# Patient Record
Sex: FEMALE | Race: ASIAN | URBAN - METROPOLITAN AREA
[De-identification: names, ages, dates, MRNs, and addresses within clinical notes are randomized per-mention and may not be internally consistent; named-entity substitution may affect disease eponyms.]

---

## 2017-03-24 ENCOUNTER — OFFICE VISIT - HEALTHEAST (OUTPATIENT)
Dept: FAMILY MEDICINE | Facility: CLINIC | Age: 37
End: 2017-03-24

## 2017-03-24 DIAGNOSIS — R42 VERTIGO: ICD-10-CM

## 2017-03-24 DIAGNOSIS — R11.2 NAUSEA & VOMITING: ICD-10-CM

## 2017-11-06 ENCOUNTER — COMMUNICATION - HEALTHEAST (OUTPATIENT)
Dept: FAMILY MEDICINE | Facility: CLINIC | Age: 37
End: 2017-11-06

## 2018-04-05 ENCOUNTER — RECORDS - HEALTHEAST (OUTPATIENT)
Dept: ADMINISTRATIVE | Facility: OTHER | Age: 38
End: 2018-04-05

## 2018-04-09 ENCOUNTER — RECORDS - HEALTHEAST (OUTPATIENT)
Dept: ADMINISTRATIVE | Facility: OTHER | Age: 38
End: 2018-04-09

## 2018-04-10 ENCOUNTER — COMMUNICATION - HEALTHEAST (OUTPATIENT)
Dept: ADMINISTRATIVE | Facility: CLINIC | Age: 38
End: 2018-04-10

## 2018-04-20 ENCOUNTER — OFFICE VISIT - HEALTHEAST (OUTPATIENT)
Dept: ENDOCRINOLOGY | Facility: CLINIC | Age: 38
End: 2018-04-20

## 2018-04-20 DIAGNOSIS — E05.90 THYROTOXICOSIS: ICD-10-CM

## 2018-04-20 RX ORDER — CHLORAL HYDRATE 500 MG
10001 CAPSULE ORAL DAILY
Status: SHIPPED | COMMUNITY
Start: 2018-04-20

## 2018-04-20 ASSESSMENT — MIFFLIN-ST. JEOR: SCORE: 1450.39

## 2018-04-27 DIAGNOSIS — O30.031 MONOCHORIONIC DIAMNIOTIC TWIN GESTATION IN FIRST TRIMESTER: Primary | ICD-10-CM

## 2018-05-01 ENCOUNTER — PRE VISIT (OUTPATIENT)
Dept: MATERNAL FETAL MEDICINE | Facility: CLINIC | Age: 38
End: 2018-05-01

## 2018-05-08 ENCOUNTER — OFFICE VISIT (OUTPATIENT)
Dept: MATERNAL FETAL MEDICINE | Facility: CLINIC | Age: 38
End: 2018-05-08
Attending: OBSTETRICS & GYNECOLOGY
Payer: COMMERCIAL

## 2018-05-08 ENCOUNTER — HOSPITAL ENCOUNTER (OUTPATIENT)
Dept: ULTRASOUND IMAGING | Facility: CLINIC | Age: 38
Discharge: HOME | End: 2018-05-08
Attending: OBSTETRICS & GYNECOLOGY | Admitting: OBSTETRICS & GYNECOLOGY
Payer: COMMERCIAL

## 2018-05-08 DIAGNOSIS — O30.031 MONOCHORIONIC DIAMNIOTIC TWIN GESTATION IN FIRST TRIMESTER: ICD-10-CM

## 2018-05-08 DIAGNOSIS — O09.521 ADVANCED MATERNAL AGE IN MULTIGRAVIDA, FIRST TRIMESTER: Primary | ICD-10-CM

## 2018-05-08 DIAGNOSIS — D56.3 ALPHA THALASSEMIA SILENT CARRIER: ICD-10-CM

## 2018-05-08 PROCEDURE — 96040 ZZH GENETIC COUNSELING, EACH 30 MINUTES: CPT | Mod: ZF | Performed by: GENETIC COUNSELOR, MS

## 2018-05-08 PROCEDURE — 76801 OB US < 14 WKS SINGLE FETUS: CPT

## 2018-05-08 NOTE — MR AVS SNAPSHOT
After Visit Summary   5/8/2018    Soni Lo    MRN: [id number]           Patient Information     Date Of Birth          1980        Visit Information        Provider Department      5/8/2018 10:15 AM Lexi Banuelos GC [facility] Maternal Fetal Medicine [facility]        Today's Diagnoses     Advanced maternal age in multigravida, first trimester    -  1    Monochorionic diamniotic twin gestation in first trimester        Alpha thalassemia silent carrier           Follow-ups after your visit        Your next 10 appointments already scheduled     Jun 08, 2018  8:45 AM CDT   MFM US COMPRE TWINS with [id number]   [facility] Maternal Fetal Medicine Ultrasound - [facility])    [address]  [facility] [address] [phone]           Wear comfortable clothes and leave your valuables at home.            Jun 08, 2018 10:00 AM CDT   Radiology MD with RH BLANCA PALUMBO   [facility] Maternal Fetal Medicine [facility])    [address]  [facility] [id number] [phone]           Please arrive at the time given for your first appointment. This visit is used internally to schedule the physician's time during your ultrasound.            Jul 06, 2018 11:45 AM CDT   MFM US COMPRE TWINS F/U with [id number]   [facility] Maternal Fetal Medicine Ultrasound - [place] ([facility])    [address]  [facility] [address] [phone]           Wear comfortable clothes and leave your valuables at home.            Jul 06, 2018 12:15 PM CDT   Radiology MD with UR BLANCA PALUMBO   [facility] Maternal Fetal Medicine - [facility])    [address]  [facility] [address]   [phone]           Please arrive at the time given for your first appointment. This visit is used internally to schedule the physician's time during your ultrasound.             "Jul 06, 2018  1:00 PM CDT   Ech Fetal Complete* with [email or web address]   [facility] Echo/EKG ([facility])    [address]  [facility] [place] [address]               Jul 06, 2018  2:00 PM CDT   Ech Fetal -Twin B Complete* with [email or web address]   [facility] Echo/EKG ([facility])    [address]  [facility] [place] [address]                 Future tests that were ordered for you today     Open Future Orders        Priority Expected Expires Ordered    MFM US Comprehensive Twins F/U Routine  3/8/2019 5/8/2018    Echo Fetal Complete-Peds Cardiology Routine  5/8/2019 5/8/2018    Echo Fetal - Twin B Complete - Peds Cardiology Routine  5/8/2019 5/8/2018            Who to contact     If you have questions or need follow up information about today's clinic visit or your schedule please contact [facility] directly at [phone].  Normal or non-critical lab and imaging results will be communicated to you by Genoomhart, letter or phone within 4 business days after the clinic has received the results. If you do not hear from us within 7 days, please contact the clinic through Nodeablet or phone. If you have a critical or abnormal lab result, we will notify you by phone as soon as possible.  Submit refill requests through Storspeed or call your pharmacy and they will forward the refill request to us. Please allow 3 business days for your refill to be completed.          Additional Information About Your Visit        Storspeed Information     Storspeed lets you send messages to your doctor, view your test results, renew your prescriptions, schedule appointments and more. To sign up, go to [email or web address] . Click on \"Log in\" on the left side of the screen, which will take you to the Welcome page. Then click on \"Sign up Now\" on the right side of the page.     You will be asked to enter the access code listed below, as well as some personal information. Please follow the " directions to create your username and password.     Your access code is: [email or web address]  Expires: 2018  3:46 PM     Your access code will  in 90 days. If you need help or a new code, please call your [facility] clinic or [phone].        Care EveryWhere ID     This is your Care EveryWhere ID. This could be used by other organizations to access your [facility] medical records  [id number]        Your Vitals Were     Last Period                   2018            Blood Pressure from Last 3 Encounters:   No data found for BP    Weight from Last 3 Encounters:   No data found for Wt              We Performed the Following     MFM Genetic Counseling        Primary Care Provider    None Specified       No primary provider on file.        Equal Access to Services     SONIA VICENTE : Joao Erickson, juany jorge, jen chaparro, evan barrow . [facility] [phone].    ATENCIÓN: Si habla español, tiene a luciano disposición servicios gratuitos de asistencia lingüística. Llame al [phone].    We comply with applicable federal civil rights laws and [place] laws. We do not discriminate on the basis of race, color, national origin, age, disability, sex, sexual orientation, or gender identity.            Thank you!     Thank you for choosing [facility]  for your care. Our goal is always to provide you with excellent care. Hearing back from our patients is one way we can continue to improve our services. Please take a few minutes to complete the written survey that you may receive in the mail after your visit with us. Thank you!             Your Updated Medication List - Protect others around you: Learn how to safely use, store and throw away your medicines at www.disposemymeds.org.      Notice  As of 2018  5:07 PM    You have not been prescribed any medications.

## 2018-05-08 NOTE — PROGRESS NOTES
"Please see \"Imaging\" tab under \"Chart Review\" for details of today's visit.    Marnie Rosenbaum    "

## 2018-05-08 NOTE — PROGRESS NOTES
[facility]etal [facility]  Genetic Counseling Consult    Patient: Soni Lo YOB: 1980   Date of Service: May 8, 2018 Referring Provider:  Dr. 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  She was accompanied to clinic today by her .       Summary/Plan:     1.  At age 38 at delivery, the risk for a woman to have a baby at birth with a chromosome problem is about 0.8%.  We talked about that most monochorionic, diamniotic twin pregnancies come from a single conception, and so this risk figure would be expected to apply to the approximate risk for both babies to have a chromosome problem.  However, Soni had a normal NIPT analysis earlier in pregnancy through her local prenatal care provider's office.  While the exact sensitivity of this test in twin gestations is still not definitively known, this normal result would be expected to reduce the risk of a chromosome problem in this pregnancy.     2.  Additionally, we reviewed Soni's previous carrier screening, which indicated that she is a single alpha-globin gene deletion carrier.  We reviewed the inheritance pattern associated with alpha-thalassemia.  We talked about that in order for their child to be at risk for a clinically significant alpha-thalassemia disorder, we would generally expect that her  must carry two, cis alpha-globin gene deletions (or other alpha-globin gene mutations).  We talked about that his risk of being a carrier of alpha-thalassemia is likely low, given his  ancestry.  We talked about both the options for general hemoglobinopathy carrier  screening (MCV, MCH, and a quantitative hemoglobin analysis) and for genetic screening of the alpha globin genes.  Soni and her  decline either form of carrier screening for Soni's  at this time.  We did talk about that babies are screening for alpha-thalassemia (and other hemoglobinopathies) after birth as part of the [place]  screen.     3.  After our discussion, Soni had a first trimester ultrasound and a maternal-fetal medicine consultation.  Please see Dr. Rosenbaum's report for more details of that consultation.     4. Maternal serum AFP only (single marker screen) is recommended to be considered after 15 weeks to screen for open neural tube defects. A quad screen is not recommended to be performed, since and NIPT analysis was previously performed.    5. An 18-20 week comprehensive ultrasound is generally considered standard of care for all women 35 or older at delivery and who decline invasive testing for chromosome problems during pregnancy.  This ultrasound has been scheduled for 18 in our clinic.    Pregnancy History:   /Parity:      Age at Delivery: 38 year old  FAUSTO: 2018, by Last Menstrual Period  Gestational Age: 13w6d    Soni reports that she takes prenatal vitamins, a fish oil supplement, and a calcium supplement.      Soni reports that she smokes around 2 cigarettes a day.  She reports that she is familiar with the concerns about smoking during pregnancy, such as an increased risk for low birth weight and  delivery.  We talked about that reducing the amount of smoking (preferably quitting) can reduce these risks.  It is reported that this is a decrease from her level of pre-pregnancy smoking of about 15 cigarettes a day.      Per her prenatal record, Soni has had a couple of first trimester ultrasounds that have shown that this is a monochorionic, diamniotic twin pregnancy.      Soni reports that her two previous pregnancies occurred with a  "previous partner; she reports no complications with those pregnancies.       Family History:     A four-generation pedigree was obtained and will be scanned under the  Media  tab in EPIC. The following significant findings were reported by Soni and her :      As noted above, Soni had carrier screening that indicated that she is likely a single alpha-globin gene deletion carrier for alpha-thalassemia.  See Carrier Screening section below for more details.      Soni reports that she has a maternal first cousin that  of a \"heart attack\" in his 20s.  She didn't know any details about the type of heart issue this was.  We talked about that there are a variety of different types of heart conditions associated with sudden cardiac death that can be caused by genetic factors.  Soni was not aware of any specific genetic diagnosis in her cousin, nor was she aware of any other history of early onset sudden cardiac death in numerous other relatives on that side of the family.  It is hard to be specific about risks related to this history without more specific information.      Soni's  reports that he has a paternal aunt that  around age 10 of complications of a congenital heart defect.  He couldn't remember the exact name of this issue, but he thought that she had a heart valve replaced.  Soni's  was not aware of any other physical birth defects, significant medical issues, or developmental concerns in that individual; she was not aware of any other family history of heart defects. We talked about that most heart defects occur as an isolated birth defect, meaning that most commonly, they are not part of an underlying genetic syndrome or condition.  We talked about that most heart defects are thought to have a \"multifactorial inheritance\" pattern, meaning that there are likely several genetic and/or environmental factors that have to come together in order to produce that birth defect.  We " talked about that for most types of birth defects, we don't know those exact risk factors.    Soni's  reports that he also had a paternal aunt that  of breast cancer in her late 30s.  We talked about that some families with a history of early onset breast cancer have a hereditary risk factor in the family that is increasing the risk of some cancers in the family.  We talked about that there are family cancer clinics available for individuals who want more specific genetic counseling about their family history of cancer and about additional screening and testing recommendations that might be made based on this history.  He reports that he is not aware of anybody in the family having genetic counseling or genetic testing with respect to this issue.      Soni's  reports that he has a second (or farther) cousin that maybe has a diagnosis of Down syndrome.  We talked about that the vast majority of Down syndrome results from a sporadic mistake in the formation of sperm or egg that went on to lead to that child. Soni's  was not aware of any familial chromosome rearrangement that would increase the risk of a chromosome problem in this pregnancy.    Otherwise, the reported family history is negative for multiple miscarriages, stillbirths, and consanguinity.       Carrier Screening/Review of Previous Carrier Screening Results:        Soni's  reports that he is of  ancestry.  We briefly talked about that cystic fibrosis is an autosomal recessive genetic condition that occurs with increased frequency in individuals of  ancestry and carrier screening for this condition is available.  In addition,  screening in the [facility] includes cystic fibrosis.       Soni reports that she is of [place] ancestry.  We talked about that the hemoglobinopathies are a group of genetic blood diseases that occur with increased frequency in individuals of  ancestry and  carrier screening for these conditions is available.  Carrier screening for the hemoglobinopathies includes a CBC with red blood cell indices, a ferritin level, and a quantitative hemoglobin electrophoresis or HPLC.  In addition,  screening in the [facility] includes many of the hemoglobinopathies.    In addition to ethnic-based carrier screening, we talked about that expanded carrier screening for mutations in a large panel of genes associated with autosomal recessive conditions (including cystic fibrosis, spinal muscular atrophy, and others) and X-linked recessive conditions (like Fragile X syndrome) is now available      As noted above, Soni had some carrier screening drawn through her primary prenatal carrier provider.  Her carrier screening through [facility] for cystic fibrosis, Spinal Muscular Atrophy (SMA), and Fragile X syndrome were all negative.  We talked about that those negative carrier screening results likely significantly reduces the risk that she would have a child with one of these condition.      Soni's had a normal MCV, normal MCH, and a normal quantitative hemoglobin analysis as part of this screening.  However, this screening also included genetic testing for the alpha-globin genes, which was reported to show a single alpha-globin gene deletion in Soni. Because of this, we spent some time reviewing the following information:    - We talked about that alpha-thalassemia is a genetic condition that involves decreased production of part of the hemoglobin protein, which is a protein that carries oxygen in the blood.  Hemoglobin usually consists of 2 alpha- globin subunits and 2 beta-globin subunits.  Alpha-thalassemia is a condition in which less than the normal amount of alpha-globin subunits are made; this is usually because of deletions of some or all of the alpha-globin genes, but more rarely, this can be related to point mutation in the alpha-globin genes.    - We talked about  "that, in general, most people inherit 4 genes that give his/her body information about how to make the alpha-globin subunit of the hemoglobin protein.  Individuals who are missing some these genes have varying degrees of symptoms depending on how many genes are missin) Individuals that are missing 1 alpha-globin gene (\"1 gene deletion\") often do not have obvious symptoms, and because of this, are called \"silent carriers\".  We talked about that Soni's blood testing is consistent with this type of result.       2) Individuals who are missing 2 alpha-globin genes (\"2 gene deletions\") are often found to have microcytic anemia on blood testing that is not related to iron deficiency anemia; this is called alpha-thalassemia trait. Most individuals with alpha-thalassemia trait do not have any significant medical issues for which they require treatment.  It is important information for their physicians to know this information, primarily so they aren't given large doses of iron, which would not \"fix\" their microcytosis.        3) Individuals who are missing 3 alpha-globin genes (\"3 gene deletions\") have hemoglobin H disease, which can be associated with clinically significant chronic anemia that might require additional medical treatment.        4) Babies that have no alpha-globin genes (\"4 gene deletions\") have a severe condition called alpha- thalassemia major. This condition generally leads to severe fetal hydrops during pregnancy and usually results in a stillbirth.      We talked about that in order for Soni and her  to have a baby with a clinically significant hemoglobinopathy, it would be expected that Soni's  would have to also be a carrier of an alpha- globin-related hemoglobinopathy.  We talked about that since Soni's test results indicated that she only carries one alpha-globin gene deletion, her  would likely have to carry two cis alpha-globin gene deletions (or otherwise " abnormal alpha-globin gene), since children need to generally have only one working alpha-globin gene (or none) in order to have a clinically significant form of alpha-thalassemia.  We talked about that given that Soni's  is of  ancestry, the chance that he only has two working alpha-globin genes in unlikely.  Soni's  reports that he does not recall ever being told that he has microcytic anemia on previous blood testing.       We talked about that there is some blood testing that can be done that provides more information about whether or not an individual carries any unusual hemoglobin and/or has gene deletions of the alpha-globin subunit genes.  We talked about that routine carrier screening for hemoglobinopathies includes a CBC with red blood indices and a quantitative hemoglobin electrophoresis or HPLC, a ferritin level.  Additionally, we talked about that genetic testing looking at the alpha-globin genes for more detailed screening is also available.  However, given the likely still low chance for a clinically significant hemoglobinopathy in this pregnancy (and that alpha-thalassemia is screened for by the [place]  screen), Soni and her  indicated that they are not interested in carrier screening for Soni's  at this time.       Risk Assessment for Chromosome Conditions:       We talked about that the risk for fetal chromosome abnormalities increases with maternal age. We briefly discussed specific features of common chromosome abnormalities, including Down syndrome, trisomy 13, trisomy 18, and sex chromosome trisomies.       At age 38 at delivery, the risk to have a baby with any chromosome abnormality at birth is about1 in 129.  (At age 37 at midtrimester, the risk to have a baby with any chromosome abnormality at midtrimester is about 1 in 105.)  We talked about that most monochorionic, diamniotic twin pregnancies come from a single conception, and so this risk  figure would be expected to apply to the approximate risk for both babies to have a chromosome problem.          Soni had maternal serum-based screening earlier in pregnancy. See below for summary of her test results:     Non-invasive Prenatal Testing (NIPT)  - This test involves measurement of cell-free DNA testing, which is of both maternal and placental/fetal origin.  - This test screens for fetal trisomy 21, trisomy 13, and trisomy 18 in twin gestations.  - While this test is thought to be highly specific/sensitive with respect to screening for trisomy 21, trisomy 13, and trisomy 18 in apple pregnancies, rare false positives and false negatives do occur. We talked about that the test performance (eg. sensitivity/specificity) of non-invasive prenatal testing in multiple gestations is not well known at this time; we talked about that limited data has been published regarding NIPT in twin or other multiple gestations.     Soni had a Innatal test earlier in pregnancy; we reviewed the results today, which are normal for chromosome 13, chromosome 18 and chromosome 21 (no aneuploidy detected).     While the exact sensitivity of this test in twin gestations is still not definitively known, this normal result would be expected to reduce the risk of trisomy 13, trisomy 18, and trisomy 21 in this pregnancy.     - This test cannot screen for open neural tube defects, and so consideration of maternal serum AFP 15 weeks or later is recommended.    Testing Options:       We reviewed the benefits and limitations of screening and diagnostic tests:    - We talked about that screening tests provide a risk assessment specific to the pregnancy for certain fetal chromosome abnormalities, but cannot definitively diagnose or exclude a fetal chromosome abnormality. Follow-up genetic counseling and consideration of diagnostic testing is recommended with any abnormal screening result.     - We discussed that dagnostic tests are  associated with a risk of miscarriage. These tests can detect fetal chromosome abnormalities with greater than 99% certainty. Results can rarely be complicated by maternal cell contamination or mosaicism and are limited by the resolution of cytogenetic G-banding technology.     -There is no screening nor diagnostic test that can detect all forms of birth defects or mental disability.      We discussed the following screening and testing options:     Genetic Amniocentesis  - This is an invasive procedure typically performed at 15 weeks or later, through which amniotic fluid is obtained for the purpose of chromosome analysis and/or other prenatal genetic analysis.  - Amniocentesis is considered a diagnostic test for chromosome problems during pregnancy.  - The risk of pregnancy loss associated with amniocentesis is generally estimated to be 1/500 or less.  - Amniotic fluid AFP measurement is also done to screen for the possibility of open neural tube or ventral defects.     Comprehensive (Level II) ultrasound  - This detailed ultrasound is usually performed between 18-22 weeks gestation to screen for major birth defects.  - It also screens for ultrasound markers that might increase the risk for a chromosome problem in a pregnancy.     Face-to-face time of the genetic counseling appointment was 45 minutes.    Nancy Banuelos MS, [facility]  Genetic Counselor  Ph: [phone]  Pager: [phone]

## 2018-05-08 NOTE — MR AVS SNAPSHOT
After Visit Summary   5/8/2018    Soni Lo    MRN: [id number]           Patient Information     Date Of Birth          1980        Visit Information        Provider Department      5/8/2018 11:45 AM Marnie Rosenbaum MD [facility] Maternal Fetal Medicine [facility]        Today's Diagnoses     Monochorionic diamniotic twin gestation in first trimester           Follow-ups after your visit        Your next 10 appointments already scheduled     Jun 08, 2018  8:45 AM CDT   MFM US COMPRE TWINS with RHMFMUSR3   [facility] Maternal Fetal Medicine Ultrasound - [facility] ([facility])    [address]  [facility] [address]   [phone]           Wear comfortable clothes and leave your valuables at home.            Jun 08, 2018 10:00 AM CDT   Radiology MD with  BLANCA PALUMBO   [facility] Maternal Fetal Medicine [facility] ([facility])    [address]  [facility] [address] [phone]           Please arrive at the time given for your first appointment. This visit is used internally to schedule the physician's time during your ultrasound.            Jul 06, 2018 11:45 AM CDT   MFM US COMPRE TWINS F/U with URMFMUSR3   [facility] Maternal Fetal Medicine Ultrasound - [place] ([facility])    [address]  [facility] [address] [phone]           Wear comfortable clothes and leave your valuables at home.            Jul 06, 2018 12:15 PM CDT   Radiology MD with UR BLANCA PALUMBO   [facility] Maternal Fetal Medicine - [facility])    [address]  [facility] [address]   [phone]           Please arrive at the time given for your first appointment. This visit is used internally to schedule the physician's time during your ultrasound.            Jul 06, 2018  1:00 PM CDT   Ech Fetal Complete* with URFETR1   [facility] Echo[facility]EKG ([facility]  "[facility])    [address]  [facility] [place] [address]               Jul 06, 2018  2:00 PM CDT   Ech Fetal -Twin B Complete* with URFETR1   [facility] ([facility])    [address]  [facility] [place] [address]                 Future tests that were ordered for you today     Open Future Orders        Priority Expected Expires Ordered    MFM US Comprehensive Twins F/U Routine  3/8/2019 5/8/2018    Echo Fetal Complete-Peds Cardiology Routine  5/8/2019 5/8/2018    Echo Fetal - Twin B Complete - Peds Cardiology Routine  5/8/2019 5/8/2018            Who to contact     If you have questions or need follow up information about today's clinic visit or your schedule please contact [employer] MATERNAL FETAL MEDICINE [facility] directly at [phone].  Normal or non-critical lab and imaging results will be communicated to you by MyChart, letter or phone within 4 business days after the clinic has received the results. If you do not hear from us within 7 days, please contact the clinic through [employer] or phone. If you have a critical or abnormal lab result, we will notify you by phone as soon as possible.  Submit refill requests through M:Metrics or call your pharmacy and they will forward the refill request to us. Please allow 3 business days for your refill to be completed.          Additional Information About Your Visit        [employer] Information     [employer] lets you send messages to your doctor, view your test results, renew your prescriptions, schedule appointments and more. To sign up, go to [email or web address] . Click on \"Log in\" on the left side of the screen, which will take you to the Welcome page. Then click on \"Sign up Now\" on the right side of the page.     You will be asked to enter the access code listed below, as well as some personal information. Please follow the directions to create your username and password.     Your access code is: [email or web address]  Expires: 8/6/2018  " 3:46 PM     Your access code will  in 90 days. If you need help or a new code, please call your [facility] clinic or [phone].        Care EveryWhere ID     This is your Care EveryWhere ID. This could be used by other organizations to access your [facility] medical records  [id number]        Your Vitals Were     Last Period                   2018            Blood Pressure from Last 3 Encounters:   No data found for BP    Weight from Last 3 Encounters:   No data found for Wt              We Performed the Following     MFM Office Visit        Primary Care Provider    None Specified       No primary provider on file.        Equal Access to Services     [facility]: Hadii aad ku hadasho Soomaali, waaxda luqadaha, qaybta kaalmada krysta, evan barrow . So [facility] [phone].    ATENCIÓN: Si habla español, tiene a luicano disposición servicios gratuitos de asistencia lingüística. Llame al [phone].    We comply with applicable federal civil rights laws and [place] laws. We do not discriminate on the basis of race, color, national origin, age, disability, sex, sexual orientation, or gender identity.            Thank you!     Thank you for choosing [facility]  for your care. Our goal is always to provide you with excellent care. Hearing back from our patients is one way we can continue to improve our services. Please take a few minutes to complete the written survey that you may receive in the mail after your visit with us. Thank you!             Your Updated Medication List - Protect others around you: Learn how to safely use, store and throw away your medicines at www.disposemymeds.org.      Notice  As of 2018  3:46 PM    You have not been prescribed any medications.

## 2018-05-24 ENCOUNTER — HOSPITAL ENCOUNTER (OUTPATIENT)
Dept: ULTRASOUND IMAGING | Facility: CLINIC | Age: 38
Discharge: HOME | End: 2018-05-24
Attending: OBSTETRICS & GYNECOLOGY | Admitting: OBSTETRICS & GYNECOLOGY
Payer: COMMERCIAL

## 2018-05-24 ENCOUNTER — OFFICE VISIT (OUTPATIENT)
Dept: MATERNAL FETAL MEDICINE | Facility: CLINIC | Age: 38
End: 2018-05-24
Attending: OBSTETRICS & GYNECOLOGY
Payer: COMMERCIAL

## 2018-05-24 DIAGNOSIS — O30.032 MONOCHORIONIC DIAMNIOTIC TWIN GESTATION IN SECOND TRIMESTER: Primary | ICD-10-CM

## 2018-05-24 DIAGNOSIS — O26.90 PREGNANCY RELATED CONDITION, ANTEPARTUM: ICD-10-CM

## 2018-05-24 PROCEDURE — 76810 OB US >/= 14 WKS ADDL FETUS: CPT

## 2018-05-24 PROCEDURE — 76820 UMBILICAL ARTERY ECHO: CPT | Performed by: OBSTETRICS & GYNECOLOGY

## 2018-05-24 NOTE — MR AVS SNAPSHOT
After Visit Summary   5/24/2018    Soni Lo    MRN: [id number]           Patient Information     Date Of Birth          1980        Visit Information        Provider Department      5/24/2018 2:45 PM Kristan Andrade,  [facility] Maternal Fetal Medicine [facility]        Today's Diagnoses     Monochorionic diamniotic twin gestation in second trimester    -  1       Follow-ups after your visit        Your next 10 appointments already scheduled     Jun 01, 2018  2:15 PM CDT   MFM US COMPRE TWINS F/U with URMFMUSR4   [facility]ound - [place] ([facility])    [address]  [facility] [address] [phone]           Wear comfortable clothes and leave your valuables at home.            Jun 01, 2018  2:45 PM CDT   Radiology MD with UR BLANCA PALUMBO   [facility] - [facility])    [address]  [facility] [address]   [phone]           Please arrive at the time given for your first appointment. This visit is used internally to schedule the physician's time during your ultrasound.            Jun 08, 2018  8:45 AM CDT   MFM US COMPRE TWINS with RHMFMUSR3   [facility]ound - [facility])    [address]  [facility] [id number] [phone]           Wear comfortable clothes and leave your valuables at home.            Jun 08, 2018 10:00 AM CDT   Radiology MD with RH BLANCA PALUMBO   [facility] - [facility])    [address]  [facility] [id number] [phone]           Please arrive at the time given for your first appointment. This visit is used internally to schedule the physician's time during your ultrasound.            Jul 06, 2018 11:45 AM CDT   MFM US COMPRE TWINS F/U with URMFMUSR3   [facility] Ul[facility]ound - [place] ([facility])    [address]  [facility] [address] [phone]           Wear comfortable clothes and leave your valuables at home.            Jul 06, 2018 12:15 PM CDT   Radiology MD with UR MFM MD   [facility] Maternal Fetal Medicine - [place] ([facility])    [address]  [facility] [address]   [phone]           Please arrive at the time given for your first appointment. This visit is used internally to schedule the physician's time during your ultrasound.            Jul 06, 2018  1:00 PM CDT   Ech Fetal Complete* with URFETR1   [facility] ([facility])    [address] [place] [facility] [address]               Jul 06, 2018  2:00 PM CDT   Ech Fetal -Twin B Complete* with URFETR1   [facility] Echo[facility]EKG ([facility])    [address] [facility] [address]                 Future tests that were ordered for you today     Open Future Orders        Priority Expected Expires Ordered    [facility] US Comprehensive Twins F/U Routine 5/31/2018 3/24/2019 5/24/2018    [facility] US OB Complete 2/3 Tri Twins Routine  3/24/2019 5/24/2018            Who to contact     If you have questions or need follow up information about today's clinic visit or your schedule please contact [facility] directly at [phone].  Normal or non-critical lab and imaging results will be communicated to you by Banch[employer]art, letter or phone within 4 business days after the clinic has received the results. If you do not hear from us within 7 days, please contact the clinic through [employer] or phone. If you have a critical or abnormal lab result, we will notify you by phone as soon as possible.  Submit refill requests through [employer] or call your pharmacy and they will forward the refill request to us. Please allow 3 business days for your refill  "to be completed.          Additional Information About Your Visit        [employer] Information     [employer] lets you send messages to your doctor, view your test results, renew your prescriptions, schedule appointments and more. To sign up, go to [email or web address] . Click on \"Log in\" on the left side of the screen, which will take you to the Welcome page. Then click on \"Sign up Now\" on the right side of the page.     You will be asked to enter the access code listed below, as well as some personal information. Please follow the directions to create your username and password.     Your access code is: [email or web address]  Expires: 2018  3:46 PM     Your access code will  in 90 days. If you need help or a new code, please call your [facility] clinic or [phone].        Care EveryWhere ID     This is your Care EveryWhere ID. This could be used by other organizations to access your [facility] medical records  [id number]        Your Vitals Were     Last Period                   2018            Blood Pressure from Last 3 Encounters:   No data found for BP    Weight from Last 3 Encounters:   No data found for Wt               Primary Care Provider    None Specified       No primary provider on file.        Equal Access to Services     ALLYSON VICENTE : Hadii sonia Erickson, wayonida ania, qaybta kaalmada krysta, evan frank. So [facility] [phone].    ATENCIÓN: Si habla español, tiene a luciano disposición servicios gratuitos de asistencia lingüística. Llame al [phone].    We comply with applicable federal civil rights laws and [place] laws. We do not discriminate on the basis of race, color, national origin, age, disability, sex, sexual orientation, or gender identity.            Thank you!     Thank you for choosing [facility]  for your care. Our goal is always to provide you with excellent care. Hearing back from our patients is one " way we can continue to improve our services. Please take a few minutes to complete the written survey that you may receive in the mail after your visit with us. Thank you!             Your Updated Medication List - Protect others around you: Learn how to safely use, store and throw away your medicines at www.disposemymeds.org.      Notice  As of 5/24/2018 11:59 PM    You have not been prescribed any medications.

## 2018-05-25 NOTE — PROGRESS NOTES
"Please see \"Imaging\" tab under \"Chart Review\" for details of today's US.    Kristan Andrade, DO    "

## 2018-06-01 ENCOUNTER — HOSPITAL ENCOUNTER (OUTPATIENT)
Dept: ULTRASOUND IMAGING | Facility: CLINIC | Age: 38
Discharge: HOME | End: 2018-06-01
Attending: OBSTETRICS & GYNECOLOGY | Admitting: OBSTETRICS & GYNECOLOGY
Payer: COMMERCIAL

## 2018-06-01 ENCOUNTER — OFFICE VISIT (OUTPATIENT)
Dept: MATERNAL FETAL MEDICINE | Facility: CLINIC | Age: 38
End: 2018-06-01
Attending: OBSTETRICS & GYNECOLOGY
Payer: COMMERCIAL

## 2018-06-01 DIAGNOSIS — O30.032 MONOCHORIONIC DIAMNIOTIC TWIN GESTATION IN SECOND TRIMESTER: Primary | ICD-10-CM

## 2018-06-01 DIAGNOSIS — O30.032 MONOCHORIONIC DIAMNIOTIC TWIN GESTATION IN SECOND TRIMESTER: ICD-10-CM

## 2018-06-01 PROCEDURE — 76820 UMBILICAL ARTERY ECHO: CPT | Performed by: OBSTETRICS & GYNECOLOGY

## 2018-06-01 PROCEDURE — 76816 OB US FOLLOW-UP PER FETUS: CPT

## 2018-06-01 NOTE — MR AVS SNAPSHOT
After Visit Summary   6/1/2018    Soni Lo    MRN: [id number]           Patient Information     Date Of Birth          1980        Visit Information        Provider Department      6/1/2018 2:45 PM Marnie Rosenbaum MD [facility] Maternal Fetal Medicine [facility]        Today's Diagnoses     Monochorionic diamniotic twin gestation in second trimester    -  1       Follow-ups after your visit        Your next 10 appointments already scheduled     Jun 08, 2018  8:45 AM CDT   MFM US COMPRE TWINS with RHMFMUSR3   [facility] Maternal Fetal Medicine Ultrasound - [facility] ([facility])    [address]  [facility] [id number] [phone]           Wear comfortable clothes and leave your valuables at home.            Jun 08, 2018 10:00 AM CDT   Radiology MD with  BLANCA PALUMBO   [facility] Maternal Fetal Medicine - [facility])    [address]  [facility] [id number] [phone]           Please arrive at the time given for your first appointment. This visit is used internally to schedule the physician's time during your ultrasound.            Jul 06, 2018 11:45 AM CDT   MFM US COMPRE TWINS F/U with URMFMUSR3   [facility] Maternal Fetal Medicine Ultrasound - [place] ([facility])    [address]  [facility] [address] [phone]           Wear comfortable clothes and leave your valuables at home.            Jul 06, 2018 12:15 PM CDT   Radiology MD with UR BLANCA PALUMBO   [facility] Maternal Fetal Medicine - [place] ([facility])    [address]  [facility] [address]   [phone]           Please arrive at the time given for your first appointment. This visit is used internally to schedule the physician's time during your ultrasound.            Jul 06, 2018  1:00 PM CDT   Ech Fetal Complete* with URFETR1   [facility] Echo[facility]EKG ([facility]  "[facility])    [address]  [facility] [address]               2018  2:00 PM CDT   Ech Fetal -Twin B Complete* with URFETR1   [facility] Echo/EKG ([facility])    [address]  [facility] [place] [address]                 Who to contact     If you have questions or need follow up information about today's clinic visit or your schedule please contact [facility] MATERNAL FETAL MEDICINE [facility] directly at [phone].  Normal or non-critical lab and imaging results will be communicated to you by TeamVisibilityhart, letter or phone within 4 business days after the clinic has received the results. If you do not hear from us within 7 days, please contact the clinic through [employer] or phone. If you have a critical or abnormal lab result, we will notify you by phone as soon as possible.  Submit refill requests through [employer] or call your pharmacy and they will forward the refill request to us. Please allow 3 business days for your refill to be completed.          Additional Information About Your Visit        [employer] Information     [employer] lets you send messages to your doctor, view your test results, renew your prescriptions, schedule appointments and more. To sign up, go to [email or web address] . Click on \"Log in\" on the left side of the screen, which will take you to the Welcome page. Then click on \"Sign up Now\" on the right side of the page.     You will be asked to enter the access code listed below, as well as some personal information. Please follow the directions to create your username and password.     Your access code is: [email or web address]  Expires: 2018  3:46 PM     Your access code will  in 90 days. If you need help or a new code, please call your [place] clinic or [phone].        Care EveryWhere ID     This is your Care EveryWhere ID. This could be used by other organizations to access your [place] medical records  [id number]        Your Vitals Were     " Last Period                   01/31/2018            Blood Pressure from Last 3 Encounters:   No data found for BP    Weight from Last 3 Encounters:   No data found for Wt              Today, you had the following     No orders found for display       Primary Care Provider    None Specified       No primary provider on file.        Equal Access to Services     SONIA VICENTE : Hadjasmin aad ku haddinorah Erickson, wayonida luqadaha, jen kaalmada krysta, evan hiroin hayaamathew annechristian hernandez pushpa frank. So [facility] [phone].    ATENCIÓN: Si habla español, tiene a luciano disposición servicios gratuitos de asistencia lingüística. Llame al [phone].    We comply with applicable federal civil rights laws and [place] laws. We do not discriminate on the basis of race, color, national origin, age, disability, sex, sexual orientation, or gender identity.            Thank you!     Thank you for choosing [facility]  for your care. Our goal is always to provide you with excellent care. Hearing back from our patients is one way we can continue to improve our services. Please take a few minutes to complete the written survey that you may receive in the mail after your visit with us. Thank you!             Your Updated Medication List - Protect others around you: Learn how to safely use, store and throw away your medicines at www.disposemymeds.org.      Notice  As of 6/1/2018  8:15 PM    You have not been prescribed any medications.

## 2018-06-08 ENCOUNTER — HOSPITAL ENCOUNTER (OUTPATIENT)
Dept: ULTRASOUND IMAGING | Facility: CLINIC | Age: 38
Discharge: HOME | End: 2018-06-08
Attending: OBSTETRICS & GYNECOLOGY | Admitting: OBSTETRICS & GYNECOLOGY
Payer: COMMERCIAL

## 2018-06-08 ENCOUNTER — OFFICE VISIT (OUTPATIENT)
Dept: MATERNAL FETAL MEDICINE | Facility: CLINIC | Age: 38
End: 2018-06-08
Attending: OBSTETRICS & GYNECOLOGY
Payer: COMMERCIAL

## 2018-06-08 DIAGNOSIS — O30.032 MONOCHORIONIC DIAMNIOTIC TWIN GESTATION IN SECOND TRIMESTER: Primary | ICD-10-CM

## 2018-06-08 DIAGNOSIS — O26.90 PREGNANCY RELATED CONDITION, ANTEPARTUM: ICD-10-CM

## 2018-06-08 PROCEDURE — 76811 OB US DETAILED SNGL FETUS: CPT

## 2018-06-08 PROCEDURE — 76820 UMBILICAL ARTERY ECHO: CPT | Mod: 59 | Performed by: OBSTETRICS & GYNECOLOGY

## 2018-06-08 NOTE — MR AVS SNAPSHOT
After Visit Summary   6/8/2018    Soni Lo    MRN: [id number]           Patient Information     Date Of Birth          1980        Visit Information        Provider Department      6/8/2018 10:00 AM Angie Hoang MD [facility] Maternal Fetal Medicine [facility]        Today's Diagnoses     Monochorionic diamniotic twin gestation in second trimester    -  1       Follow-ups after your visit        Your next 10 appointments already scheduled     Flynn 15, 2018 11:45 AM CDT   MFM US COMPRE TWINS F/U with RHMFMUSR1   [facility] Maternal Fetal Medicine Ultrasound - [facility] ([facility])    [address]  [facility] [address]   [phone]           Wear comfortable clothes and leave your valuables at home.            Flynn 15, 2018 12:15 PM CDT   Radiology MD with  BLANCA PALUMBO   [facility] Maternal Fetal Medicine [facility] ([facility])    [address]  [facility] [address]   [phone]           Please arrive at the time given for your first appointment. This visit is used internally to schedule the physician's time during your ultrasound.            Jun 22, 2018  9:30 AM CDT   MFM US COMPRE TWINS F/U with RHMFMUSR2   [facility] Maternal Fetal Medicine Ultrasound - [facility] ([facility])    [address]  [facility] [address]   [phone]           Wear comfortable clothes and leave your valuables at home.            Jun 22, 2018 10:00 AM CDT   Radiology MD with [facility]   [facility] Maternal Fetal Medicine [facility] ([facility])    [address]  [facility] [address]   [phone]           Please arrive at the time given for your first appointment. This visit is used internally to schedule the physician's time during your ultrasound.            Jun 29, 2018  8:00 AM CDT   MFM US COMPRE TWINS F/U with RHMFMUSR1   [facility] Maternal Fetal Medicine Ultrasound [facility]  ([facility])    [address]  [facility] [address]   [phone]           Wear comfortable clothes and leave your valuables at home.            Jun 29, 2018  8:30 AM CDT   Radiology MD with  BLANCA PALUMBO   [facility] Maternal Fetal Medicine - [facility] ([facility])    [address]  [facility] [address]   [phone]           Please arrive at the time given for your first appointment. This visit is used internally to schedule the physician's time during your ultrasound.            Jul 06, 2018 11:45 AM CDT   [facility] US COMPRE TWINS F/U with URMFMUSR3   [facility] Maternal Fetal Medicine Ultrasound - [place] ([facility])    [address]  [facility] [address] [phone]           Wear comfortable clothes and leave your valuables at home.            Jul 06, 2018 12:15 PM CDT   Radiology MD with UR BLANCA PALUMBO   [facility] Maternal Fetal Medicine - [facility])    [address]  [facility] [address]   [phone]           Please arrive at the time given for your first appointment. This visit is used internally to schedule the physician's time during your ultrasound.            Jul 06, 2018  1:00 PM CDT   Ech Fetal Complete* with URFETR1   [facility] Echo/EKG ([facility])    [address]  [facility] [place] [address]               Jul 06, 2018  2:00 PM CDT   Ech Fetal -Twin B Complete* with URFETR1   [facility] Echo/EKG ([facility])    [address]  [facility] [place] [address]                 Future tests that were ordered for you today     Open Future Orders        Priority Expected Expires Ordered    [facility] US Comprehensive Twins F/U Routine  4/8/2019 6/8/2018    [facility] US Comprehensive Twins F/U Routine 6/15/2018 4/8/2019 6/8/2018    [facility] US Comprehensive Twins F/U Routine 6/22/2018 4/8/2019 6/8/2018            Who to  "contact     If you have questions or need follow up information about today's clinic visit or your schedule please contact [facility] MATERNAL FETAL MEDICINE [facility] directly at [phone].  Normal or non-critical lab and imaging results will be communicated to you by MyChart, letter or phone within 4 business days after the clinic has received the results. If you do not hear from us within 7 days, please contact the clinic through MyChart or phone. If you have a critical or abnormal lab result, we will notify you by phone as soon as possible.  Submit refill requests through PeerIndex or call your pharmacy and they will forward the refill request to us. Please allow 3 business days for your refill to be completed.          Additional Information About Your Visit        PeerIndex Information     PeerIndex lets you send messages to your doctor, view your test results, renew your prescriptions, schedule appointments and more. To sign up, go to [email or web address] . Click on \"Log in\" on the left side of the screen, which will take you to the Welcome page. Then click on \"Sign up Now\" on the right side of the page.     You will be asked to enter the access code listed below, as well as some personal information. Please follow the directions to create your username and password.     Your access code is: [email or web address]  Expires: 2018  3:46 PM     Your access code will  in 90 days. If you need help or a new code, please call your [facility] clinic or [phone].        Care EveryWhere ID     This is your Care EveryWhere ID. This could be used by other organizations to access your [facility] medical records  [id number]        Your Vitals Were     Last Period                   2018            Blood Pressure from Last 3 Encounters:   No data found for BP    Weight from Last 3 Encounters:   No data found for Wt               Primary Care Provider    None Specified       No primary provider on file.        Equal " Access to Services     [facility] : Hadii sonia Erickson, wayonisindy jorge, goodevan olivear. So [facility] [phone].    ATENCIÓN: Si habla español, tiene a luciano disposición servicios gratuitos de asistencia lingüística. Llame al [phone].    We comply with applicable federal civil rights laws and [place] laws. We do not discriminate on the basis of race, color, national origin, age, disability, sex, sexual orientation, or gender identity.            Thank you!     Thank you for choosing [facility]  for your care. Our goal is always to provide you with excellent care. Hearing back from our patients is one way we can continue to improve our services. Please take a few minutes to complete the written survey that you may receive in the mail after your visit with us. Thank you!             Your Updated Medication List - Protect others around you: Learn how to safely use, store and throw away your medicines at www.disposemymeds.org.      Notice  As of 6/8/2018 11:00 AM    You have not been prescribed any medications.

## 2018-06-08 NOTE — PROGRESS NOTES
Please see full imaging report from ViewPoint program under imaging tab.    Findings reviewed with Soni and her partner today. The findings are again similar to what was seen two weeks ago, and I remain concerned that this might progress to TTTS or sIUGR. The placenta is posterior which makes her a candidate for possible laser treatment of placental vessels. We discussed referral to a practice that performs laser therapy if TTTS is suspected/diagnosed. She is planning delivery at [facility] with her primary OB, so they would like referral to [place]  [facility] practice for laser if indicated. Follow up weekly for TTTS check and growth every two weeks given concerns for TTTS or sIUGR development.     Angie Hoang MD  Maternal Fetal Medicine

## 2018-06-15 ENCOUNTER — OFFICE VISIT (OUTPATIENT)
Dept: MATERNAL FETAL MEDICINE | Facility: CLINIC | Age: 38
End: 2018-06-15
Attending: OBSTETRICS & GYNECOLOGY
Payer: COMMERCIAL

## 2018-06-15 ENCOUNTER — HOSPITAL ENCOUNTER (OUTPATIENT)
Dept: ULTRASOUND IMAGING | Facility: CLINIC | Age: 38
Discharge: HOME | End: 2018-06-15
Attending: OBSTETRICS & GYNECOLOGY | Admitting: OBSTETRICS & GYNECOLOGY
Payer: COMMERCIAL

## 2018-06-15 DIAGNOSIS — O30.032 MONOCHORIONIC DIAMNIOTIC TWIN GESTATION IN SECOND TRIMESTER: ICD-10-CM

## 2018-06-15 DIAGNOSIS — O30.032 MONOCHORIONIC DIAMNIOTIC TWIN GESTATION IN SECOND TRIMESTER: Primary | ICD-10-CM

## 2018-06-15 PROCEDURE — 76816 OB US FOLLOW-UP PER FETUS: CPT | Mod: 59

## 2018-06-15 PROCEDURE — 76820 UMBILICAL ARTERY ECHO: CPT | Performed by: OBSTETRICS & GYNECOLOGY

## 2018-06-15 NOTE — PROGRESS NOTES
"Please see \"Imaging\" tab under \"Chart Review\" for details of today's US at the [facility].    Mannie Yeboah MD  Maternal-Fetal Medicine    "

## 2018-06-15 NOTE — MR AVS SNAPSHOT
After Visit Summary   6/15/2018    Soni Lo    MRN: [id number]           Patient Information     Date Of Birth          1980        Visit Information        Provider Department      6/15/2018 12:15 PM Mannie Yeboah MD [facility] Maternal Fetal Medicine [facility]        Today's Diagnoses     Monochorionic diamniotic twin gestation in second trimester    -  1       Follow-ups after your visit        Your next 10 appointments already scheduled     Jun 22, 2018  9:30 AM CDT   MFM US COMPRE TWINS F/U with RHMFMUSR2   [facility] Maternal Fetal Medicine Ultrasound - [facility])    [address]  [facility] [address] [phone]           Wear comfortable clothes and leave your valuables at home.            Jun 22, 2018 10:00 AM CDT   Radiology MD with  BLANCA PALUMBO   [facility] Maternal Fetal Medicine [facility])    [address]  [facility] [id number] [phone]           Please arrive at the time given for your first appointment. This visit is used internally to schedule the physician's time during your ultrasound.            Jun 29, 2018  8:00 AM CDT   MFM US COMPRE TWINS F/U with RHMFMUSR1   [facility] Maternal Fetal Medicine Ultrasound - [facility])    [address]  [facility] [id number] [phone]           Wear comfortable clothes and leave your valuables at home.            Jun 29, 2018  8:30 AM CDT   Radiology [facility]   [facility] Maternal Fetal Medicine [facility])    [address]  [facility] [id number] [phone]           Please arrive at the time given for your first appointment. This visit is used internally to schedule the physician's time during your ultrasound.            Jul 06, 2018 11:45 AM CDT   MFM US COMPRE TWINS F/U with URMFMUSR3   [facility] Maternal Fetal Medicine Ultrasound [facility]  "([facility])    [address]  [facility] [address] [phone]           Wear comfortable clothes and leave your valuables at home.            Jul 06, 2018 12:15 PM CDT   Radiology MD with UR BLANCA PALUMBO   [facility] Maternal Fetal Medicine [facility] ([facility])    [address]  [facility] [address]   [phone]           Please arrive at the time given for your first appointment. This visit is used internally to schedule the physician's time during your ultrasound.            Jul 06, 2018  1:00 PM CDT   Ech Fetal Complete* with URFETR1   [facility] Echo/EKG ([facility])    [address] [facility] [address]               Jul 06, 2018  2:00 PM CDT   Ech Fetal -Twin B Complete* with URFETR1   [facility] ([facility])    [address] [address]                 Who to contact     If you have questions or need follow up information about today's clinic visit or your schedule please contact [facility] MATERNAL FETAL MEDICINE [facility] directly at [phone].  Normal or non-critical lab and imaging results will be communicated to you by Bureaux A Partagerhart, letter or phone within 4 business days after the clinic has received the results. If you do not hear from us within 7 days, please contact the clinic through Bure[employer] A Partagerhart or phone. If you have a critical or abnormal lab result, we will notify you by phone as soon as possible.  Submit refill requests through Quantuvis or call your pharmacy and they will forward the refill request to us. Please allow 3 business days for your refill to be completed.          Additional Information About Your Visit        [employer] Part[employer] Information     Quantuvis lets you send messages to your doctor, view your test results, renew your prescriptions, schedule appointments and more. To sign up, go to [email or web address] . Click on \"Log in\" on " "the left side of the screen, which will take you to the Welcome page. Then click on \"Sign up Now\" on the right side of the page.     You will be asked to enter the access code listed below, as well as some personal information. Please follow the directions to create your username and password.     Your access code is: [email or web address]  Expires: 2018  3:46 PM     Your access code will  in 90 days. If you need help or a new code, please call your [facility] or [phone].        Care EveryWhere ID     This is your Care EveryWhere ID. This could be used by other organizations to access your [facility] medical records  [id number]        Your Vitals Were     Last Period                   2018            Blood Pressure from Last 3 Encounters:   No data found for BP    Weight from Last 3 Encounters:   No data found for Wt              Today, you had the following     No orders found for display       Primary Care Provider    None Specified       No primary provider on file.        Equal Access to Services     [facility]: Hadii sonia santacruzo Soarcenio, waaxda luradha, qaybta kaalmada ademurphy, evan barrow . So [facility] [phone].    ATENCIÓN: Si habla español, tiene a luciano disposición servicios gratuitos de asistencia lingüística. Llame al [phone].    We comply with applicable federal civil rights laws and [place] laws. We do not discriminate on the basis of race, color, national origin, age, disability, sex, sexual orientation, or gender identity.            Thank you!     Thank you for choosing [facility]  for your care. Our goal is always to provide you with excellent care. Hearing back from our patients is one way we can continue to improve our services. Please take a few minutes to complete the written survey that you may receive in the mail after your visit with us. Thank you!             Your Updated Medication List - Protect others " around you: Learn how to safely use, store and throw away your medicines at www.disposemymeds.org.      Notice  As of 6/15/2018 12:43 PM    You have not been prescribed any medications.

## 2018-06-22 ENCOUNTER — HOSPITAL ENCOUNTER (OUTPATIENT)
Dept: ULTRASOUND IMAGING | Facility: CLINIC | Age: 38
Discharge: HOME | End: 2018-06-22
Attending: OBSTETRICS & GYNECOLOGY | Admitting: OBSTETRICS & GYNECOLOGY
Payer: COMMERCIAL

## 2018-06-22 ENCOUNTER — OFFICE VISIT (OUTPATIENT)
Dept: MATERNAL FETAL MEDICINE | Facility: CLINIC | Age: 38
End: 2018-06-22
Attending: OBSTETRICS & GYNECOLOGY
Payer: COMMERCIAL

## 2018-06-22 DIAGNOSIS — O30.032 MONOCHORIONIC DIAMNIOTIC TWIN GESTATION IN SECOND TRIMESTER: ICD-10-CM

## 2018-06-22 DIAGNOSIS — O30.032 MONOCHORIONIC DIAMNIOTIC TWIN GESTATION IN SECOND TRIMESTER: Primary | ICD-10-CM

## 2018-06-22 PROCEDURE — 76820 UMBILICAL ARTERY ECHO: CPT | Mod: 59 | Performed by: OBSTETRICS & GYNECOLOGY

## 2018-06-22 PROCEDURE — 76816 OB US FOLLOW-UP PER FETUS: CPT

## 2018-06-22 NOTE — MR AVS SNAPSHOT
After Visit Summary   6/22/2018    Soni Lo    MRN: [id number]           Patient Information     Date Of Birth          1980        Visit Information        Provider Department      6/22/2018 10:00 AM Marnie Rosenbaum MD [facility] Maternal Fetal Medicine [facility]        Today's Diagnoses     Monochorionic diamniotic twin gestation in second trimester    -  1       Follow-ups after your visit        Your next 10 appointments already scheduled     Jun 29, 2018  8:00 AM CDT   MFM US COMPRE TWINS F/U with RHMFMUSR1   [facility] Maternal Fetal Medicine Ultrasound - [facility])    [address]  [facility] [phone] [phone]           Wear comfortable clothes and leave your valuables at home.            Jun 29, 2018  8:30 AM CDT   Radiology MD with  BLANCA PALUMBO   [facility] Maternal Fetal Medicine [facility] ([facility])    [address]  [facility] [address] [phone]           Please arrive at the time given for your first appointment. This visit is used internally to schedule the physician's time during your ultrasound.            Jul 06, 2018 11:45 AM CDT   MFM US COMPRE TWINS F/U with URMFMUSR3   [facility] Maternal Fetal Medicine Ultrasound - [place] ([facility])    [address]  [facility] [address] [phone]           Wear comfortable clothes and leave your valuables at home.            Jul 06, 2018 12:15 PM CDT   Radiology MD with UR BLANCA PALUMBO   [facility] Maternal Fetal Medicine - [facility])    [address]  [facility] [address]   [phone]           Please arrive at the time given for your first appointment. This visit is used internally to schedule the physician's time during your ultrasound.            Jul 06, 2018  1:00 PM CDT   Ech Fetal Complete* with URFETR1   [facility] Echo/EKG ([facility]  "[facility])    [address]  [facility] [place] [address]               2018  2:00 PM CDT   Ech Fetal -Twin B Complete* with URFETR1   [facility] Echo/EKG ([facility])    [address]  [facility] [place] [address]                 Who to contact     If you have questions or need follow up information about today's clinic visit or your schedule please contact [facility] MATERNAL FETAL MEDICINE - [facility] directly at [phone].  Normal or non-critical lab and imaging results will be communicated to you by Tagorizehart, letter or phone within 4 business days after the clinic has received the results. If you do not hear from us within 7 days, please contact the clinic through Zipnosist or phone. If you have a critical or abnormal lab result, we will notify you by phone as soon as possible.  Submit refill requests through HOSTEX or call your pharmacy and they will forward the refill request to us. Please allow 3 business days for your refill to be completed.          Additional Information About Your Visit        HOSTEX Information     HOSTEX lets you send messages to your doctor, view your test results, renew your prescriptions, schedule appointments and more. To sign up, go to [email or web address] . Click on \"Log in\" on the left side of the screen, which will take you to the Welcome page. Then click on \"Sign up Now\" on the right side of the page.     You will be asked to enter the access code listed below, as well as some personal information. Please follow the directions to create your username and password.     Your access code is: [email or web address]  Expires: 2018  3:46 PM     Your access code will  in 90 days. If you need help or a new code, please call your [place] clinic or [phone].        Care EveryWhere ID     This is your Care EveryWhere ID. This could be used by other organizations to access your [place] medical records  [id number]        Your Vitals Were "     Last Period                   01/31/2018            Blood Pressure from Last 3 Encounters:   No data found for BP    Weight from Last 3 Encounters:   No data found for Wt              Today, you had the following     No orders found for display       Primary Care Provider    None Specified       No primary provider on file.        Equal Access to Services     [facility] : Hadjasmin aad ku haddinorah Erickson, wayonida luqadaha, jen kaalmada krysta, evan hiroin hayaamathew annechristian hernandez pushpa frank. So [facility] [phone].    ATENCIÓN: Si habla español, tiene a luciano disposición servicios gratuitos de asistencia lingüística. Llame al [phone].    We comply with applicable federal civil rights laws and [place] laws. We do not discriminate on the basis of race, color, national origin, age, disability, sex, sexual orientation, or gender identity.            Thank you!     Thank you for choosing [facility]  for your care. Our goal is always to provide you with excellent care. Hearing back from our patients is one way we can continue to improve our services. Please take a few minutes to complete the written survey that you may receive in the mail after your visit with us. Thank you!             Your Updated Medication List - Protect others around you: Learn how to safely use, store and throw away your medicines at www.disposemymeds.org.      Notice  As of 6/22/2018  3:16 PM    You have not been prescribed any medications.

## 2018-06-29 ENCOUNTER — HOSPITAL ENCOUNTER (OUTPATIENT)
Dept: ULTRASOUND IMAGING | Facility: CLINIC | Age: 38
Discharge: HOME | End: 2018-06-29
Attending: OBSTETRICS & GYNECOLOGY | Admitting: OBSTETRICS & GYNECOLOGY
Payer: COMMERCIAL

## 2018-06-29 ENCOUNTER — OFFICE VISIT (OUTPATIENT)
Dept: MATERNAL FETAL MEDICINE | Facility: CLINIC | Age: 38
End: 2018-06-29
Attending: OBSTETRICS & GYNECOLOGY
Payer: COMMERCIAL

## 2018-06-29 ENCOUNTER — HOSPITAL ENCOUNTER (OUTPATIENT)
Dept: CARDIOLOGY | Facility: CLINIC | Age: 38
Discharge: HOME | End: 2018-06-29
Attending: OBSTETRICS & GYNECOLOGY | Admitting: OBSTETRICS & GYNECOLOGY
Payer: COMMERCIAL

## 2018-06-29 DIAGNOSIS — O30.031 MONOCHORIONIC DIAMNIOTIC TWIN GESTATION IN FIRST TRIMESTER: ICD-10-CM

## 2018-06-29 DIAGNOSIS — O30.032 MONOCHORIONIC DIAMNIOTIC TWIN GESTATION IN SECOND TRIMESTER: ICD-10-CM

## 2018-06-29 DIAGNOSIS — O36.8390 FETAL ARRHYTHMIA AFFECTING PREGNANCY, ANTEPARTUM: Primary | ICD-10-CM

## 2018-06-29 PROCEDURE — 76827 ECHO EXAM OF FETAL HEART: CPT

## 2018-06-29 PROCEDURE — 76820 UMBILICAL ARTERY ECHO: CPT | Performed by: OBSTETRICS & GYNECOLOGY

## 2018-06-29 PROCEDURE — 76825 ECHO EXAM OF FETAL HEART: CPT

## 2018-06-29 PROCEDURE — 76816 OB US FOLLOW-UP PER FETUS: CPT

## 2018-06-29 NOTE — CONSULTS
Fetal Cardiology Consultation    Patient:  Soni Lo MRN:  [id number]   YOB: 1980 Age:  37 year old   Date of Visit:  6/29/2018 PCP:  No primary care provider on file.   FAUSTO: 11/7/2018, by Last Menstrual Period EGA: 21w2d weeks     Dear Dr. Rosenbaum:    I had the pleasure of seeing Soni Lo at the [facility] Fetal [facility]chocardiography [facility] in [place] on 6/29/2018 in consultation for fetal echocardiography results. She presented today accompanied by her partner. As you know, she is a 37 year old female with multiple gestation (monochorionic diamniotic twins) with ongoing monitoring for possible early TTTS; she was seen today with Dr. Andrade in routine follow-up and at that ultrasound fetus 2/B had a persistently low fetal heart rate ~60-70bpm, without other concerning features for fetal status.    In both twins, the fetal echocardiogram was technically challenging. Likely normal fetal echocardiogram. Normal fetal cardiac anatomy. Normal right and left ventricular size and function without hypertrophy. No evidence of diastolic dysfunction.  No atrioventricular valve regurgitation. No pericardial effusion. In fetus 2/B, throughout the study the fetal heart rhythm was a normal 1:1 atrioventricular rhythm at 140-150bpm; no ectopy or pauses; there was one <10sec run of ostensible sinus bradycardia associated with significant probe pressure.    I reviewed and interpreted the fetal echocardiogram today. I discussed the normal results with Ms. Jaimee Lo and her partner. It remains unclear the etiology of the significant bradycardia in fetus 2/B earlier today; most likely possibilities include vagal-mediated sinus bradycardia associated with ultrasound technique, and possibly blocked atrial bigeminy (though I saw no ectopy through >45 minutes of monitoring today). Were the bradycardia this morning due to congenital atrioventricular block, or to  abnormal sinus-node function such as that associated with congenital long-QT syndrome, I would not expect those etiologies to spontaneously completely resolve in a matter of hours. Similarly, if there was an absent or abnormal sinus node e.g. in association with heterotaxy syndromes/left atrial isomerism, etc., I would expect to see these structural anomalies, and the rhythm would not be now normal. Finally, while these structural results are normal, it is important to note that fetal echocardiography cannot exclude small atrial or ventricular septal defects, persistent ductus arteriosus, mild coarctation of the aorta, partial anomalous pulmonary venous return, minor anatomic valve anomalies, or coronary artery anomalies.     I discussed results of the study and visit with Dr. Andrade.  -- No additional fetal echocardiograms are recommended for this pregnancy.  -- Weekly fetal heart rate assessment is recommended along with the ongoing monitoring for TTTS.    Thank you for allowing me to participate in Ms. Jaimee Lo's care. Please don't hesitate to contact me or the Fetal Cardiology team at [facility] with any questions or concerns, or if there are concerns for evolving TTTS or recurrent rhythm issues.     I spent a total of 30 minutes face-to-face with Ms. Jaimee Lo during today's office visit. Over 50% of this time was spent counseling the patient and/or coordinating care regarding the fetal echocardiography results.     Himanshu Crump  Pediatric Cardiology  [facility]  Phone [phone]

## 2018-06-29 NOTE — MR AVS SNAPSHOT
After Visit Summary   6/29/2018    Soni Lo    MRN: [id number]           Patient Information     Date Of Birth          1980        Visit Information        Provider Department      6/29/2018 8:30 AM Kristan Andrade DO [facility] Maternal Fetal Medicine - [facility]        Today's Diagnoses     Fetal arrhythmia affecting pregnancy, antepartum    -  1    Monochorionic diamniotic twin gestation in second trimester           Follow-ups after your visit        Your next 10 appointments already scheduled     Jun 29, 2018  1:00 PM CDT   Ech Fetal Complete* with URFETR1   [facility] Echo/EKG ([facility])    [address]  \[facility]"" [place] [address]               Jun 29, 2018  2:00 PM CDT   Ech Fetal -Twin B Complete* with URFETR1   [facility] Echo[facility]EKG ([facility])    [address]  [facility] [address]               Jul 06, 2018 11:45 AM CDT   MFM US COMPRE TWINS F/U with URMFMUSR3   [facility] Maternal Fetal Medicine Ultrasound - [facility])    [address]  [facility] [address] [phone]           Wear comfortable clothes and leave your valuables at home.            Jul 06, 2018 12:15 PM CDT   Radiology MD with UR MFM MD   [facility] Maternal Fetal Medicine - [facility])    [address]  [facility] [address]   [phone]           Please arrive at the time given for your first appointment. This visit is used internally to schedule the physician's time during your ultrasound.              Future tests that were ordered for you today     Open Future Orders        Priority Expected Expires Ordered    Echo Fetal Complete-Peds Cardiology Routine 6/29/2018 6/29/2019 6/29/2018    Echo Fetal - Twin B Complete - Peds Cardiology Routine 6/29/2018 6/29/2019 6/29/2018            Who to contact     If you have questions or need  "follow up information about today's clinic visit or your schedule please contact [facility] MATERNAL FETAL MEDICINE [facility] directly at [phone].  Normal or non-critical lab and imaging results will be communicated to you by [employer], letter or phone within 4 business days after the clinic has received the results. If you do not hear from us within 7 days, please contact the clinic through [employer] or phone. If you have a critical or abnormal lab result, we will notify you by phone as soon as possible.  Submit refill requests through [employer] or call your pharmacy and they will forward the refill request to us. Please allow 3 business days for your refill to be completed.          Additional Information About Your Visit        [employer] Information     [employer] lets you send messages to your doctor, view your test results, renew your prescriptions, schedule appointments and more. To sign up, go to [email or web address] . Click on \"Log in\" on the left side of the screen, which will take you to the Welcome page. Then click on \"Sign up Now\" on the right side of the page.     You will be asked to enter the access code listed below, as well as some personal information. Please follow the directions to create your username and password.     Your access code is: [email or web address]  Expires: 2018  3:46 PM     Your access code will  in 90 days. If you need help or a new code, please call your [facility] clinic or [phone].        Care EveryWhere ID     This is your Care EveryWhere ID. This could be used by other organizations to access your [facility] medical records  [id number]        Your Vitals Were     Last Period                   2018            Blood Pressure from Last 3 Encounters:   No data found for BP    Weight from Last 3 Encounters:   No data found for Wt               Primary Care Provider    None Specified       No primary provider on file.        Equal Access to Services     SONIA VICENTE AH: Joao scott " kimberley Erickson, juany jorge, jen resendizmasindy gironarpitasindy, waxmaya alberto nuñezhoustonansley barrow zac. So [facility] [phone].    ATENCIÓN: Si habla español, tiene a luciano disposición servicios gratuitos de asistencia lingüística. Llame al [phone].    We comply with applicable federal civil rights laws and [place] laws. We do not discriminate on the basis of race, color, national origin, age, disability, sex, sexual orientation, or gender identity.            Thank you!     Thank you for choosing [facility]  for your care. Our goal is always to provide you with excellent care. Hearing back from our patients is one way we can continue to improve our services. Please take a few minutes to complete the written survey that you may receive in the mail after your visit with us. Thank you!             Your Updated Medication List - Protect others around you: Learn how to safely use, store and throw away your medicines at www.disposemymeds.org.      Notice  As of 6/29/2018 10:25 AM    You have not been prescribed any medications.

## 2018-07-03 ENCOUNTER — HOSPITAL ENCOUNTER (OUTPATIENT)
Dept: ULTRASOUND IMAGING | Facility: CLINIC | Age: 38
Discharge: HOME | End: 2018-07-03
Attending: OBSTETRICS & GYNECOLOGY | Admitting: OBSTETRICS & GYNECOLOGY
Payer: COMMERCIAL

## 2018-07-03 ENCOUNTER — OFFICE VISIT (OUTPATIENT)
Dept: MATERNAL FETAL MEDICINE | Facility: CLINIC | Age: 38
End: 2018-07-03
Attending: OBSTETRICS & GYNECOLOGY
Payer: COMMERCIAL

## 2018-07-03 VITALS — SYSTOLIC BLOOD PRESSURE: 115 MMHG | HEART RATE: 70 BPM | DIASTOLIC BLOOD PRESSURE: 70 MMHG

## 2018-07-03 DIAGNOSIS — O36.8390 FETAL ARRHYTHMIA AFFECTING PREGNANCY, ANTEPARTUM: ICD-10-CM

## 2018-07-03 DIAGNOSIS — O30.031 MONOCHORIONIC DIAMNIOTIC TWIN GESTATION IN FIRST TRIMESTER: ICD-10-CM

## 2018-07-03 DIAGNOSIS — O30.039 MONOCHORIONIC DIAMNIOTIC TWIN PREGNANCY, ANTEPARTUM: Primary | ICD-10-CM

## 2018-07-03 PROCEDURE — 76820 UMBILICAL ARTERY ECHO: CPT | Performed by: OBSTETRICS & GYNECOLOGY

## 2018-07-03 PROCEDURE — 76816 OB US FOLLOW-UP PER FETUS: CPT | Mod: 59

## 2018-07-03 NOTE — MR AVS SNAPSHOT
After Visit Summary   7/3/2018    Soni Lo    MRN: [id number]           Patient Information     Date Of Birth          1980        Visit Information        Provider Department      7/3/2018 2:45 PM Tiffany De La Fuente MD [facility] Maternal Fetal Medicine - [facility]        Today's Diagnoses     Monochorionic diamniotic twin pregnancy, antepartum    -  1    Fetal arrhythmia affecting pregnancy, antepartum           Follow-ups after your visit        Your next 10 appointments already scheduled     Jul 06, 2018 11:45 AM CDT   MFM US COMPRE TWINS F/U with [id number]   [facility] Maternal Fetal Medicine Ultrasound - [facility])    [address]  [facility] [address] [phone]           Wear comfortable clothes and leave your valuables at home.            Jul 06, 2018 12:15 PM CDT   Radiology MD with UR BLANCA PALUMBO   [facility] Maternal Fetal Medicine - [facility])    [address]  [facility] [address]   [phone]           Please arrive at the time given for your first appointment. This visit is used internally to schedule the physician's time during your ultrasound.              Future tests that were ordered for you today     Open Future Orders        Priority Expected Expires Ordered    Echo Fetal Complete Follow Up-Peds Cardiology Routine  7/3/2019 7/3/2018    Echo Fetal Complete Follow Up Twin B Routine  7/3/2019 7/3/2018    [facility] US Comprehensive Twins F/U Routine  5/3/2019 7/3/2018    [facility] US Comprehensive Twins F/U Routine  5/3/2019 7/3/2018    [facility] US Comprehensive Twins F/U Routine  5/3/2019 7/3/2018    [facility] US Comprehensive Twins F/U Routine  5/3/2019 7/3/2018    [facility] US Comprehensive Twins F/U Routine 7/10/2018 5/3/2019 7/3/2018    [facility] US Comprehensive Twins F/U Routine 7/13/2018 5/3/2019 7/3/2018    [facility] US Comprehensive Twins F/U Routine  5/2/2019 7/2/2018            Who to  contact     If you have questions or need follow up information about today's clinic visit or your schedule please contact [facility] MATERNAL FETAL MEDICINE [facility] directly at [phone].  Normal or non-critical lab and imaging results will be communicated to you by MyChart, letter or phone within 4 business days after the clinic has received the results. If you do not hear from us within 7 days, please contact the clinic through MyChart or phone. If you have a critical or abnormal lab result, we will notify you by phone as soon as possible.  Submit refill requests through [employer] or call your pharmacy and they will forward the refill request to us. Please allow 3 business days for your refill to be completed.          Additional Information About Your Visit        Highlight Information     Highlight gives you secure access to your electronic health record. If you see a primary care provider, you can also send messages to your care team and make appointments. If you have questions, please call your primary care clinic.  If you do not have a primary care provider, please call [phone] and they will assist you.        Care EveryWhere ID     This is your Care EveryWhere ID. This could be used by other organizations to access your [facility] medical records  [id number]        Your Vitals Were     Pulse Last Period                70 01/31/2018           Blood Pressure from Last 3 Encounters:   07/03/18 115/70    Weight from Last 3 Encounters:   No data found for Wt               Primary Care Provider    None Specified       No primary provider on file.        Equal Access to Services     SONIA VICENTE : Joao Erickson, juany jorge, qaybta evan levy. So [facility] [phone].    ATENCIÓN: Si habla español, tiene a luciano disposición servicios gratuitos de asistencia lingüística. Stephen al [phone].    We comply with applicable federal civil rights laws and  [place] laws. We do not discriminate on the basis of race, color, national origin, age, disability, sex, sexual orientation, or gender identity.            Thank you!     Thank you for choosing [facility]  for your care. Our goal is always to provide you with excellent care. Hearing back from our patients is one way we can continue to improve our services. Please take a few minutes to complete the written survey that you may receive in the mail after your visit with us. Thank you!             Your Updated Medication List - Protect others around you: Learn how to safely use, store and throw away your medicines at www.disposemymeds.org.      Notice  As of 7/3/2018  4:24 PM    You have not been prescribed any medications.

## 2018-07-03 NOTE — PROGRESS NOTES
Please see ultrasound report under imaging tab for details on ultrasound performed today.    Tiffany De La Fuente MD  , OB/GYN  Maternal-Fetal Medicine  [email or web address]  [phone] (Academic office)  [phone] (Pager)

## 2018-07-04 ENCOUNTER — HEALTH MAINTENANCE LETTER (OUTPATIENT)
Age: 38
End: 2018-07-04

## 2018-07-06 ENCOUNTER — OFFICE VISIT (OUTPATIENT)
Dept: MATERNAL FETAL MEDICINE | Facility: CLINIC | Age: 38
End: 2018-07-06
Attending: OBSTETRICS & GYNECOLOGY
Payer: COMMERCIAL

## 2018-07-06 ENCOUNTER — HOSPITAL ENCOUNTER (OUTPATIENT)
Dept: CARDIOLOGY | Facility: CLINIC | Age: 38
End: 2018-07-06
Attending: OBSTETRICS & GYNECOLOGY
Payer: COMMERCIAL

## 2018-07-06 ENCOUNTER — HOSPITAL ENCOUNTER (OUTPATIENT)
Dept: ULTRASOUND IMAGING | Facility: CLINIC | Age: 38
Discharge: HOME | End: 2018-07-06
Attending: OBSTETRICS & GYNECOLOGY | Admitting: OBSTETRICS & GYNECOLOGY
Payer: COMMERCIAL

## 2018-07-06 DIAGNOSIS — O30.031 MONOCHORIONIC DIAMNIOTIC TWIN GESTATION IN FIRST TRIMESTER: ICD-10-CM

## 2018-07-06 DIAGNOSIS — O30.039 MONOCHORIONIC DIAMNIOTIC TWIN PREGNANCY, ANTEPARTUM: ICD-10-CM

## 2018-07-06 DIAGNOSIS — O30.039 MONOCHORIONIC DIAMNIOTIC TWIN PREGNANCY, ANTEPARTUM: Primary | ICD-10-CM

## 2018-07-06 PROCEDURE — 76816 OB US FOLLOW-UP PER FETUS: CPT | Mod: 59

## 2018-07-06 PROCEDURE — 76820 UMBILICAL ARTERY ECHO: CPT | Mod: 59

## 2018-07-06 PROCEDURE — 76820 UMBILICAL ARTERY ECHO: CPT

## 2018-07-06 PROCEDURE — 76826 ECHO EXAM OF FETAL HEART: CPT

## 2018-07-06 NOTE — MR AVS SNAPSHOT
After Visit Summary   7/6/2018    Soni Lo    MRN: [id number]           Patient Information     Date Of Birth          1980        Visit Information        Provider Department      7/6/2018 2:15 PM Mannie Yeboah MD [facility] Maternal Fetal Medicine - [place]        Today's Diagnoses     Monochorionic diamniotic twin pregnancy, antepartum    -  1       Follow-ups after your visit        Your next 10 appointments already scheduled     Jul 06, 2018  4:00 PM CDT   ECHO FETAL FOLLOW UP TWIN B with URFETR1   [facility] ([facility])    [address] [place] [address]  [facility] [place] [address]               Jul 10, 2018 11:45 AM CDT   MFM US COMPRE TWINS F/U with RHMFMUSR1   [facility] Maternal Fetal Medicine Ultrasound - [facility])    [address] [place] [id number] [phone]           Wear comfortable clothes and leave your valuables at home.            Jul 10, 2018 12:15 PM CDT   Radiology MD with JOSEF RIOS MD   [facility] Maternal Fetal Medicine [facility])    [address] [place] [address] [phone]           Please arrive at the time given for your first appointment. This visit is used internally to schedule the physician's time during your ultrasound.            Jul 13, 2018  3:00 PM CDT   MFM US COMPRE TWINS F/U with RHMFMUSR1   [facility] Maternal Fetal Medicine Ultrasound - [facility] ([facility])    [address]  [facility] [id number] [phone]           Wear comfortable clothes and leave your valuables at home.            Jul 13, 2018  3:30 PM CDT   Radiology MD with JOSEF RIOS MD   [facility] Maternal Fetal Medicine [facility] ([facility])    [address]  [facility] [id number] [phone]           Please arrive at the time given for your first appointment. This visit is used internally to schedule  the physician's time during your ultrasound.            Jul 17, 2018 11:45 AM CDT   MFM US COMPRE TWINS F/U with RHMFMUSR1   [facility] Maternal Fetal Medicine Ultrasound - [facility] ([facility])    [address]  [facility] [address]   [phone]           Wear comfortable clothes and leave your valuables at home.            Jul 17, 2018 12:15 PM CDT   Radiology MD with RH MFM MD   [facility] Maternal Fetal [facility])    [address]  [facility] [address]   [phone]           Please arrive at the time given for your first appointment. This visit is used internally to schedule the physician's time during your ultrasound.            Jul 20, 2018 11:00 AM CDT   MFM US COMPRE TWINS F/U with RHMFMUSR3   [facility] Maternal Fetal [facility] Ultrasound - [facility])    [address]  [facility] [id number] [phone]           Wear comfortable clothes and leave your valuables at home.            Jul 20, 2018 11:30 AM CDT   Radiology MD with  MFM MD   [facility] Maternal Fetal Medicine [facility])    [address]  [facility] [address]   [phone]           Please arrive at the time given for your first appointment. This visit is used internally to schedule the physician's time during your ultrasound.            Jul 24, 2018  8:45 AM CDT   MFM US COMPRE TWINS F/U with RHMFMUSR2   [facility] Maternal Fetal Medicine Ultrasound - [facility])    [address]  [facility] [address]   [phone]           Wear comfortable clothes and leave your valuables at home.              Who to contact     If you have questions or need follow up information about today's clinic visit or your schedule please contact [facility] MATERNAL FETAL MEDICINE [facility] directly at [phone].  Normal or non-critical lab and imaging  results will be communicated to you by MyChart, letter or phone within 4 business days after the clinic has received the results. If you do not hear from us within 7 days, please contact the clinic through [employer] or phone. If you have a critical or abnormal lab result, we will notify you by phone as soon as possible.  Submit refill requests through [employer] or call your pharmacy and they will forward the refill request to us. Please allow 3 business days for your refill to be completed.          Additional Information About Your Visit        [employer] Information     [employer] gives you secure access to your electronic health record. If you see a primary care provider, you can also send messages to your care team and make appointments. If you have questions, please call your primary care clinic.  If you do not have a primary care provider, please call [phone] and they will assist you.        Care EveryWhere ID     This is your Care EveryWhere ID. This could be used by other organizations to access your [facility] medical records  [id number]        Your Vitals Were     Last Period                   01/31/2018            Blood Pressure from Last 3 Encounters:   07/03/18 115/70    Weight from Last 3 Encounters:   No data found for Wt              Today, you had the following     No orders found for display       Primary Care Provider    None Specified       No primary provider on file.        Equal Access to Services     ALLYSON VICENTE : Joao Erickson, juany jorge, jen chaparro, evan barrow . So [facility] [phone].    ATENCIÓN: Si habla español, tiene a luciano disposición servicios gratuitos de asistencia lingüística. Llame al [phone].    We comply with applicable federal civil rights laws and [place] laws. We do not discriminate on the basis of race, color, national origin, age, disability, sex, sexual orientation, or gender identity.            Thank you!      Thank you for choosing [facility]  for your care. Our goal is always to provide you with excellent care. Hearing back from our patients is one way we can continue to improve our services. Please take a few minutes to complete the written survey that you may receive in the mail after your visit with us. Thank you!             Your Updated Medication List - Protect others around you: Learn how to safely use, store and throw away your medicines at www.disposemymeds.org.      Notice  As of 7/6/2018  3:19 PM    You have not been prescribed any medications.

## 2018-07-06 NOTE — PROGRESS NOTES
"Please see \"Imaging\" tab under \"Chart Review\" for details of today's US at the [facility].    Mannie Yeboah MD  Maternal-Fetal Medicine      "

## 2018-07-10 ENCOUNTER — OFFICE VISIT (OUTPATIENT)
Dept: MATERNAL FETAL MEDICINE | Facility: CLINIC | Age: 38
End: 2018-07-10
Attending: OBSTETRICS & GYNECOLOGY
Payer: COMMERCIAL

## 2018-07-10 ENCOUNTER — HOSPITAL ENCOUNTER (OUTPATIENT)
Dept: ULTRASOUND IMAGING | Facility: CLINIC | Age: 38
Discharge: HOME | End: 2018-07-10
Attending: OBSTETRICS & GYNECOLOGY | Admitting: OBSTETRICS & GYNECOLOGY
Payer: COMMERCIAL

## 2018-07-10 DIAGNOSIS — O36.8390 FETAL ARRHYTHMIA AFFECTING PREGNANCY, ANTEPARTUM: ICD-10-CM

## 2018-07-10 DIAGNOSIS — O40.2XX1 POLYHYDRAMNIOS IN SECOND TRIMESTER, FETUS 1 OF MULTIPLE GESTATION: ICD-10-CM

## 2018-07-10 DIAGNOSIS — O30.039 MONOCHORIONIC DIAMNIOTIC TWIN PREGNANCY, ANTEPARTUM: Primary | ICD-10-CM

## 2018-07-10 DIAGNOSIS — O30.039 MONOCHORIONIC DIAMNIOTIC TWIN PREGNANCY, ANTEPARTUM: ICD-10-CM

## 2018-07-10 PROCEDURE — 76816 OB US FOLLOW-UP PER FETUS: CPT

## 2018-07-10 PROCEDURE — 76810 OB US >/= 14 WKS ADDL FETUS: CPT | Performed by: OBSTETRICS & GYNECOLOGY

## 2018-07-10 NOTE — MR AVS SNAPSHOT
After Visit Summary   7/10/2018    Soni Lo    MRN: [id number]           Patient Information     Date Of Birth          1980        Visit Information        Provider Department      7/10/2018 12:15 PM Marnie Rosenbaum MD [facility] Maternal Fetal Medicine [facility]        Today's Diagnoses     Monochorionic diamniotic twin pregnancy, antepartum    -  1    Fetal arrhythmia affecting pregnancy, antepartum        Polyhydramnios in second trimester, fetus 1 of multiple gestation           Follow-ups after your visit        Your next 10 appointments already scheduled     Jul 13, 2018  3:00 PM CDT   MFM US COMPRE TWINS F/U with [id number]   [facility] Maternal Fetal Medicine Ultrasound - [facility] ([facility])    [address]  [facility] [address] [phone]           Wear comfortable clothes and leave your valuables at home.            Jul 13, 2018  3:30 PM CDT   Radiology MD with  BLANCA PALUMBO   [facility] Maternal Fetal Medicine [facility])    [address]  [facility] [address]   [phone]           Please arrive at the time given for your first appointment. This visit is used internally to schedule the physician's time during your ultrasound.            Jul 17, 2018 11:45 AM CDT   MFM US COMPRE TWINS F/U with RHMFMUSR1   [facility] Maternal Fetal Medicine Ultrasound - [facility] ([facility])    [address]  [facility] [address]   [phone]           Wear comfortable clothes and leave your valuables at home.            Jul 17, 2018 12:15 PM CDT   [facility]   [facility] Maternal Fetal Medicine [facility] ([facility])    [address]  [facility] [address]   [phone]           Please arrive at the time given for your first appointment. This visit is used internally to schedule the physician's time during your ultrasound.            Jul  20, 2018 11:00 AM CDT   MFM US COMPRE TWINS F/U with RHMFMUSR3   [facility] - [facility] ([facility])    [address]  [facility] [address] [phone]           Wear comfortable clothes and leave your valuables at home.            Jul 20, 2018 11:30 AM CDT   Radiology MD with RH BLANCA PALUMBO   [facility] Maternal Fetal Medicine - [facility] ([facility])    [address]  [facility] [address]   [phone]           Please arrive at the time given for your first appointment. This visit is used internally to schedule the physician's time during your ultrasound.            Jul 24, 2018  8:45 AM CDT   MFM US COMPRE TWINS F/U with RHMFMUSR2   [facility] Ul[facility]ound - [facility] ([facility])    [address]  [facility] [address] [phone]           Wear comfortable clothes and leave your valuables at home.            Jul 24, 2018  9:15 AM CDT   Radiology MD with RH BLANCA PALUMBO   [facility] Maternal Fetal Medicine [facility] ([facility])    [address]  [facility] [address] [phone]           Please arrive at the time given for your first appointment. This visit is used internally to schedule the physician's time during your ultrasound.            Jul 27, 2018 11:45 AM CDT   MFM US COMPRE TWINS F/U with RHMFMUSR2   [facility] M[facility]etal [facility] Ultrasound - [facility] ([facility])    [address]  [facility] [address]   [phone]           Wear comfortable clothes and leave your valuables at home.            Jul 27, 2018 12:15 PM CDT   Radiology MD with RH BLANCA PALUMBO   [facility] Maternal Fetal Medicine [facility] ([facility])    [address]  [facility] [address]   [phone]           Please arrive at the time given for your first appointment. This visit is used internally to  schedule the physician's time during your ultrasound.              Who to contact     If you have questions or need follow up information about today's clinic visit or your schedule please contact [facility] MATERNAL FETAL MEDICINE - [facility] directly at [phone].  Normal or non-critical lab and imaging results will be communicated to you by Preztohart, letter or phone within 4 business days after the clinic has received the results. If you do not hear from us within 7 days, please contact the clinic through Preztohart or phone. If you have a critical or abnormal lab result, we will notify you by phone as soon as possible.  Submit refill requests through [employer] or call your pharmacy and they will forward the refill request to us. Please allow 3 business days for your refill to be completed.          Additional Information About Your Visit        [employer] Information     [employer] gives you secure access to your electronic health record. If you see a primary care provider, you can also send messages to your care team and make appointments. If you have questions, please call your primary care clinic.  If you do not have a primary care provider, please call [phone] and they will assist you.        Care EveryWhere ID     This is your Care EveryWhere ID. This could be used by other organizations to access your [place] medical records  [id number]        Your Vitals Were     Last Period                   01/31/2018            Blood Pressure from Last 3 Encounters:   07/03/18 115/70    Weight from Last 3 Encounters:   No data found for Wt              Today, you had the following     No orders found for display       Primary Care Provider    None Specified       No primary provider on file.        Equal Access to Services     ALLYSON VICENTE : Joao Erickson, juany jorge, evan espinal . So [facility] [phone].    ATENCIÓN: kendra Dial luciano  disposición servicios gratuitos de asistencia lingüística. Stephen cross [phone].    We comply with applicable federal civil rights laws and [place] laws. We do not discriminate on the basis of race, color, national origin, age, disability, sex, sexual orientation, or gender identity.            Thank you!     Thank you for choosing [facility]  for your care. Our goal is always to provide you with excellent care. Hearing back from our patients is one way we can continue to improve our services. Please take a few minutes to complete the written survey that you may receive in the mail after your visit with us. Thank you!             Your Updated Medication List - Protect others around you: Learn how to safely use, store and throw away your medicines at www.disposemymeds.org.      Notice  As of 7/10/2018  1:53 PM    You have not been prescribed any medications.

## 2018-07-13 ENCOUNTER — OFFICE VISIT (OUTPATIENT)
Dept: MATERNAL FETAL MEDICINE | Facility: CLINIC | Age: 38
End: 2018-07-13
Attending: OBSTETRICS & GYNECOLOGY
Payer: COMMERCIAL

## 2018-07-13 ENCOUNTER — HOSPITAL ENCOUNTER (OUTPATIENT)
Dept: ULTRASOUND IMAGING | Facility: CLINIC | Age: 38
Discharge: HOME | End: 2018-07-13
Attending: OBSTETRICS & GYNECOLOGY | Admitting: OBSTETRICS & GYNECOLOGY
Payer: COMMERCIAL

## 2018-07-13 DIAGNOSIS — O30.039 MONOCHORIONIC DIAMNIOTIC TWIN PREGNANCY, ANTEPARTUM: ICD-10-CM

## 2018-07-13 DIAGNOSIS — O30.032 MONOCHORIONIC DIAMNIOTIC TWIN GESTATION IN SECOND TRIMESTER: Primary | ICD-10-CM

## 2018-07-13 PROCEDURE — 76816 OB US FOLLOW-UP PER FETUS: CPT | Mod: 59

## 2018-07-13 PROCEDURE — 76820 UMBILICAL ARTERY ECHO: CPT | Performed by: OBSTETRICS & GYNECOLOGY

## 2018-07-13 NOTE — PROGRESS NOTES
Please see full imaging report from ViewPoint program under imaging tab.      Angie Hoang MD  Maternal Fetal Medicine

## 2018-07-13 NOTE — MR AVS SNAPSHOT
After Visit Summary   7/13/2018    Soni Lo    MRN: [id number]           Patient Information     Date Of Birth          1980        Visit Information        Provider Department      7/13/2018 3:30 PM Angie Hoang MD [facility] Maternal Fetal Medicine [facility]        Today's Diagnoses     Monochorionic diamniotic twin gestation in second trimester    -  1       Follow-ups after your visit        Your next 10 appointments already scheduled     Jul 17, 2018 11:45 AM CDT   MFM US COMPRE TWINS F/U with [id number]   [facility] Maternal Fetal Medicine Ultrasound - [facility] ([facility])    [address]  [facility] [address] [phone]           Wear comfortable clothes and leave your valuables at home.            Jul 17, 2018 12:15 PM CDT   Radiology MD with  BLANCA PALUMBO   [facility] Fetal Medicine [facility] ([facility])    [address]  [facility] [address] [phone]           Please arrive at the time given for your first appointment. This visit is used internally to schedule the physician's time during your ultrasound.            Jul 20, 2018 11:00 AM CDT   MFM US COMPRE TWINS F/U with RHMFMUSR3   [facility] Maternal Fetal Medicine Ultrasound - [facility] ([facility])    [address]  [facility] [address]   [phone]           Wear comfortable clothes and leave your valuables at home.            Jul 20, 2018 11:30 AM CDT   [facility]   [facility] Maternal Fetal Medicine [facility] ([facility])    [address]  [facility] [address]   [phone]           Please arrive at the time given for your first appointment. This visit is used internally to schedule the physician's time during your ultrasound.            Jul 24, 2018  8:45 AM CDT   MFM US COMPRE TWINS F/U with [id number]   [facility] Maternal Fetal Medicine Ultrasound [facility]  ([facility])    [address]  [facility] [address] [phone]           Wear comfortable clothes and leave your valuables at home.            Jul 24, 2018  9:15 AM CDT   Radiology MD with  BLANCA PALUMBO   [facility] ([facility])    [address]  [facility] [id number] [phone]           Please arrive at the time given for your first appointment. This visit is used internally to schedule the physician's time during your ultrasound.            Jul 27, 2018 11:45 AM CDT   M US COMPRE TWINS F/U with [email or web address]   [facility] Fetal [facility] Ultrasound - [facility] ([facility])    [address]  [facility] [id number] [phone]           Wear comfortable clothes and leave your valuables at home.            Jul 27, 2018 12:15 PM CDT   Radiology MD with  BLANCA PALUMBO   [facility] ([facility])    [address]  [facility] [id number] [phone]           Please arrive at the time given for your first appointment. This visit is used internally to schedule the physician's time during your ultrasound.              Who to contact     If you have questions or need follow up information about today's clinic visit or your schedule please contact [facility] MATERNAL FETAL [facility] directly at [phone].  Normal or non-critical lab and imaging results will be communicated to you by Tapiturehart, letter or phone within 4 business days after the clinic has received the results. If you do not hear from us within 7 days, please contact the clinic through Tapiturehart or phone. If you have a critical or abnormal lab result, we will notify you by phone as soon as possible.  Submit refill requests through [employer] or call your pharmacy and they will forward the refill request to us. Please allow 3 business days for your refill to be  completed.          Additional Information About Your Visit        [employer] Information     [employer] gives you secure access to your electronic health record. If you see a primary care provider, you can also send messages to your care team and make appointments. If you have questions, please call your primary care clinic.  If you do not have a primary care provider, please call [phone] and they will assist you.        Care EveryWhere ID     This is your Care EveryWhere ID. This could be used by other organizations to access your [place] medical records  [id number]        Your Vitals Were     Last Period                   01/31/2018            Blood Pressure from Last 3 Encounters:   07/03/18 115/70    Weight from Last 3 Encounters:   No data found for Wt              Today, you had the following     No orders found for display       Primary Care Provider    None Specified       No primary provider on file.        Equal Access to Services     SHANTANUSanta [facility] Hospital Medical CenterVENECIA : Hadjasmin Erickson, waanderson lockwoodqadaha, qaaylinta kaalmasindy chaparro, evan barrow . So [facility] [phone].    ATENCIÓN: Si habla español, tiene a luciano disposición servicios gratuitos de asistencia lingüística. Llame al [phone].    We comply with applicable federal civil rights laws and [place] laws. We do not discriminate on the basis of race, color, national origin, age, disability, sex, sexual orientation, or gender identity.            Thank you!     Thank you for choosing [facility]  for your care. Our goal is always to provide you with excellent care. Hearing back from our patients is one way we can continue to improve our services. Please take a few minutes to complete the written survey that you may receive in the mail after your visit with us. Thank you!             Your Updated Medication List - Protect others around you: Learn how to safely use, store and throw away your medicines  at www.disposemymeds.org.      Notice  As of 7/13/2018  4:15 PM    You have not been prescribed any medications.

## 2018-07-17 ENCOUNTER — OFFICE VISIT (OUTPATIENT)
Dept: MATERNAL FETAL MEDICINE | Facility: CLINIC | Age: 38
End: 2018-07-17
Attending: OBSTETRICS & GYNECOLOGY
Payer: COMMERCIAL

## 2018-07-17 ENCOUNTER — HOSPITAL ENCOUNTER (OUTPATIENT)
Dept: ULTRASOUND IMAGING | Facility: CLINIC | Age: 38
Discharge: HOME | End: 2018-07-17
Attending: OBSTETRICS & GYNECOLOGY | Admitting: OBSTETRICS & GYNECOLOGY
Payer: COMMERCIAL

## 2018-07-17 DIAGNOSIS — O30.039 MONOCHORIONIC DIAMNIOTIC TWIN PREGNANCY, ANTEPARTUM: ICD-10-CM

## 2018-07-17 DIAGNOSIS — O30.032 MONOCHORIONIC DIAMNIOTIC TWIN GESTATION IN SECOND TRIMESTER: Primary | ICD-10-CM

## 2018-07-17 PROCEDURE — 76820 UMBILICAL ARTERY ECHO: CPT | Mod: 59 | Performed by: OBSTETRICS & GYNECOLOGY

## 2018-07-17 PROCEDURE — 76816 OB US FOLLOW-UP PER FETUS: CPT

## 2018-07-17 NOTE — MR AVS SNAPSHOT
After Visit Summary   7/17/2018    Soni Lo    MRN: [id number]           Patient Information     Date Of Birth          1980        Visit Information        Provider Department      7/17/2018 12:15 PM Angie Hoang MD [facility] Maternal Fetal Medicine [facility]        Today's Diagnoses     Monochorionic diamniotic twin gestation in second trimester    -  1       Follow-ups after your visit        Your next 10 appointments already scheduled     Jul 20, 2018 11:00 AM CDT   MFM US COMPRE TWINS F/U with [id number]   [facility] Maternal Fetal Medicine Ultrasound - [facility] ([facility])    [address]  [facility] [address] [phone]           Wear comfortable clothes and leave your valuables at home.            Jul 20, 2018 11:30 AM CDT   Radiology MD with  BLANCA PALUMBO   [facility] Maternal Fetal Medicine [facility] ([facility])    [address]  [facility] [address] [phone]           Please arrive at the time given for your first appointment. This visit is used internally to schedule the physician's time during your ultrasound.            Jul 24, 2018  8:45 AM CDT   MFM US COMPRE TWINS F/U with [id number]   [facility] Maternal Fetal Medicine Ultrasound - [facility] ([facility])    [address]  [facility] [address] [phone]           Wear comfortable clothes and leave your valuables at home.            Jul 24, 2018  9:15 AM CDT   [facility]iology [facility]   [facility] Maternal Fetal Medicine [facility] ([facility])    [address]  [facility] [address] [phone]           Please arrive at the time given for your first appointment. This visit is used internally to schedule the physician's time during your ultrasound.            Jul 27, 2018 11:45 AM CDT   MFM US COMPRE TWINS F/U with [id number]   [facility] Maternal Fetal Medicine Ultrasound [facility]  ([facility])    [address] [facility] [address]  [facility] [address] [phone]           Wear comfortable clothes and leave your valuables at home.            Jul 27, 2018 12:15 PM CDT   Radiology MD with RH BLANCA PALUMBO   [facility] Maternal Fetal [facility] ([facility])    [address]  [facility] [address] [phone]           Please arrive at the time given for your first appointment. This visit is used internally to schedule the physician's time during your ultrasound.              Who to contact     If you have questions or need follow up information about today's clinic visit or your schedule please contact [facility] MATERNAL FETAL [facility] directly at [phone].  Normal or non-critical lab and imaging results will be communicated to you by [employer], letter or phone within 4 business days after the clinic has received the results. If you do not hear from us within 7 days, please contact the clinic through [employer] or phone. If you have a critical or abnormal lab result, we will notify you by phone as soon as possible.  Submit refill requests through [employer] or call your pharmacy and they will forward the refill request to us. Please allow 3 business days for your refill to be completed.          Additional Information About Your Visit        [employer] Information     [employer] gives you secure access to your electronic health record. If you see a primary care provider, you can also send messages to your care team and make appointments. If you have questions, please call your primary care clinic.  If you do not have a primary care provider, please call [phone] and they will assist you.        Care EveryWhere ID     This is your Care EveryWhere ID. This could be used by other organizations to access your [place] medical records  [id number]        Your Vitals Were     Last Period                   01/31/2018            Blood Pressure from  Last 3 Encounters:   07/03/18 115/70    Weight from Last 3 Encounters:   No data found for Wt              Today, you had the following     No orders found for display       Primary Care Provider    None Specified       No primary provider on file.        Equal Access to Services     SONIA VICENTE : Hadii aad ku haddinorah Erickson, joyceda luqlev, jen kakathleenda krysta, evan sandoval dayanachristian hernandez laLisetangel . So [facility] [phone].    ATENCIÓN: Si habla español, tiene a luciano disposición servicios gratuitos de asistencia lingüística. Llame al [phone].    We comply with applicable federal civil rights laws and [place] laws. We do not discriminate on the basis of race, color, national origin, age, disability, sex, sexual orientation, or gender identity.            Thank you!     Thank you for choosing [facility]  for your care. Our goal is always to provide you with excellent care. Hearing back from our patients is one way we can continue to improve our services. Please take a few minutes to complete the written survey that you may receive in the mail after your visit with us. Thank you!             Your Updated Medication List - Protect others around you: Learn how to safely use, store and throw away your medicines at www.disposemymeds.org.      Notice  As of 7/17/2018 12:57 PM    You have not been prescribed any medications.

## 2018-07-20 ENCOUNTER — HOSPITAL ENCOUNTER (OUTPATIENT)
Dept: ULTRASOUND IMAGING | Facility: CLINIC | Age: 38
Discharge: HOME | End: 2018-07-20
Attending: OBSTETRICS & GYNECOLOGY | Admitting: OBSTETRICS & GYNECOLOGY
Payer: COMMERCIAL

## 2018-07-20 ENCOUNTER — OFFICE VISIT (OUTPATIENT)
Dept: MATERNAL FETAL MEDICINE | Facility: CLINIC | Age: 38
End: 2018-07-20
Attending: OBSTETRICS & GYNECOLOGY
Payer: COMMERCIAL

## 2018-07-20 DIAGNOSIS — O30.039 MONOCHORIONIC DIAMNIOTIC TWIN PREGNANCY, ANTEPARTUM: ICD-10-CM

## 2018-07-20 DIAGNOSIS — O30.032 MONOCHORIONIC DIAMNIOTIC TWIN GESTATION IN SECOND TRIMESTER: Primary | ICD-10-CM

## 2018-07-20 PROCEDURE — 76820 UMBILICAL ARTERY ECHO: CPT | Mod: 59 | Performed by: OBSTETRICS & GYNECOLOGY

## 2018-07-20 PROCEDURE — 76816 OB US FOLLOW-UP PER FETUS: CPT | Mod: 59

## 2018-07-20 NOTE — MR AVS SNAPSHOT
After Visit Summary   7/20/2018    Soni Lo    MRN: [id number]           Patient Information     Date Of Birth          1980        Visit Information        Provider Department      7/20/2018 11:30 AM Mannie Yeboah MD [facility] Maternal Fetal Medicine [facility]        Today's Diagnoses     Monochorionic diamniotic twin gestation in second trimester    -  1       Follow-ups after your visit        Your next 10 appointments already scheduled     Jul 24, 2018  8:45 AM CDT   MFM US COMPRE TWINS F/U with [id number]   [facility] Maternal Fetal Medicine Ultrasound - [facility] ([facility])    [address]  [facility] [id number] [phone]           Wear comfortable clothes and leave your valuables at home.            Jul 24, 2018  9:15 AM CDT   Radiology MD with  BLANCA PALUMBO   [facility] Maternal Fetal Medicine [facility] ([facility])    [address]  [facility] [address] [phone]           Please arrive at the time given for your first appointment. This visit is used internally to schedule the physician's time during your ultrasound.            Jul 27, 2018 11:45 AM CDT   MFM US COMPRE TWINS F/U with RHMFMUSR2   [facility] Maternal Fetal Medicine Ultrasound - [facility] ([facility])    [address]  [facility] [address]   [phone]           Wear comfortable clothes and leave your valuables at home.            Jul 27, 2018 12:15 PM CDT   Radiology MD with  BLANCA PALUMBO   [facility] Maternal Fetal Medicine [facility] ([facility])    [address]  [facility] [address]   [phone]           Please arrive at the time given for your first appointment. This visit is used internally to schedule the physician's time during your ultrasound.            Jul 31, 2018  8:00 AM CDT   MFM US COMPRE TWINS F/U with RHMFMUSR3   [facility] Maternal Fetal Medicine Ultrasound [facility]  ([facility])    [address]  [facility] [address]   [phone]           Wear comfortable clothes and leave your valuables at home.            Jul 31, 2018  8:30 AM CDT   Radiology MD with RH BLANCA PALUMBO   [facility] Maternal Fetal Medicine - [facility] ([facility])    [address]  [facility] [address]   [phone]           Please arrive at the time given for your first appointment. This visit is used internally to schedule the physician's time during your ultrasound.            Aug 03, 2018  8:00 AM CDT   MFM US COMPRE TWINS F/U with RHMFMUSR2   [facility] Maternal Fetal Medicine Ultrasound - [facility] ([facility])    [address]  [facility] [address]   [phone]           Wear comfortable clothes and leave your valuables at home.            Aug 03, 2018  8:30 AM CDT   Radiology MD with JOSEF RIOS MD   [facility] Maternal Fetal Medicine [facility] ([facility])    [address]  [facility] [address]   [phone]           Please arrive at the time given for your first appointment. This visit is used internally to schedule the physician's time during your ultrasound.            Aug 07, 2018  8:00 AM CDT   MFM US COMPRE TWINS F/U with RHMFMUSR1   [facility] Maternal Fetal Medicine Ultrasound - [facility] ([facility])    [address]  [facility] [address]   [phone]           Wear comfortable clothes and leave your valuables at home.            Aug 07, 2018  8:30 AM CDT   Radiology MD with  BLANCA PALUMBO   [facility] Maternal Fetal Medicine [facility] ([facility])    [address]  [facility] [address]   [phone]           Please arrive at the time given for your first appointment. This visit is used internally to schedule the physician's time during your ultrasound.              Who to contact     If you have questions or need  follow up information about today's clinic visit or your schedule please contact [facility] directly at [phone].  Normal or non-critical lab and imaging results will be communicated to you by MyChart, letter or phone within 4 business days after the clinic has received the results. If you do not hear from us within 7 days, please contact the clinic through "[employer]hart or phone. If you have a critical or abnormal lab result, we will notify you by phone as soon as possible.  Submit refill requests through [employer] or call your pharmacy and they will forward the refill request to us. Please allow 3 business days for your refill to be completed.          Additional Information About Your Visit        "[employer]har"[employer] Information     [employer] gives you secure access to your electronic health record. If you see a primary care provider, you can also send messages to your care team and make appointments. If you have questions, please call your primary care clinic.  If you do not have a primary care provider, please call [phone] and they will assist you.        Care EveryWhere ID     This is your Care EveryWhere ID. This could be used by other organizations to access your [place] medical records  [id number]        Your Vitals Were     Last Period                   01/31/2018            Blood Pressure from Last 3 Encounters:   07/03/18 115/70    Weight from Last 3 Encounters:   No data found for Wt              Today, you had the following     No orders found for display       Primary Care Provider    None Specified       No primary provider on file.        Equal Access to Services     SONIA VICENTE : Hadii sonia Erickson, juany jorge, evan espinal . So [facility] [phone].    ATENCIÓN: Si habla español, tiene a luciano disposición servicios gratuitos de asistencia lingüística. Llame al [phone].    We comply with applicable federal  civil rights laws and [place] laws. We do not discriminate on the basis of race, color, national origin, age, disability, sex, sexual orientation, or gender identity.            Thank you!     Thank you for choosing [facility]  for your care. Our goal is always to provide you with excellent care. Hearing back from our patients is one way we can continue to improve our services. Please take a few minutes to complete the written survey that you may receive in the mail after your visit with us. Thank you!             Your Updated Medication List - Protect others around you: Learn how to safely use, store and throw away your medicines at www.disposemymeds.org.      Notice  As of 7/20/2018 11:35 AM    You have not been prescribed any medications.

## 2018-07-20 NOTE — PROGRESS NOTES
"Please see \"Imaging\" tab under \"Chart Review\" for details of today's US at the [facility].    Mannie Yeboah MD  Maternal-Fetal Medicine    "

## 2018-07-24 ENCOUNTER — HOSPITAL ENCOUNTER (OUTPATIENT)
Dept: ULTRASOUND IMAGING | Facility: CLINIC | Age: 38
Discharge: HOME | End: 2018-07-24
Attending: OBSTETRICS & GYNECOLOGY | Admitting: OBSTETRICS & GYNECOLOGY
Payer: COMMERCIAL

## 2018-07-24 ENCOUNTER — OFFICE VISIT (OUTPATIENT)
Dept: MATERNAL FETAL MEDICINE | Facility: CLINIC | Age: 38
End: 2018-07-24
Attending: OBSTETRICS & GYNECOLOGY
Payer: COMMERCIAL

## 2018-07-24 DIAGNOSIS — O30.039 MONOCHORIONIC DIAMNIOTIC TWIN PREGNANCY, ANTEPARTUM: ICD-10-CM

## 2018-07-24 DIAGNOSIS — O26.872 CERVICAL SHORTENING AFFECTING PREGNANCY IN SECOND TRIMESTER: ICD-10-CM

## 2018-07-24 DIAGNOSIS — O30.032 MONOCHORIONIC DIAMNIOTIC TWIN GESTATION IN SECOND TRIMESTER: Primary | ICD-10-CM

## 2018-07-24 DIAGNOSIS — O40.2XX1 POLYHYDRAMNIOS IN SECOND TRIMESTER, FETUS 1 OF MULTIPLE GESTATION: ICD-10-CM

## 2018-07-24 PROCEDURE — 76820 UMBILICAL ARTERY ECHO: CPT | Mod: 59 | Performed by: OBSTETRICS & GYNECOLOGY

## 2018-07-24 PROCEDURE — 76816 OB US FOLLOW-UP PER FETUS: CPT | Mod: 59

## 2018-07-24 NOTE — MR AVS SNAPSHOT
After Visit Summary   7/24/2018    Soni Lo    MRN: [id number]           Patient Information     Date Of Birth          1980        Visit Information        Provider Department      7/24/2018 9:15 AM Tiffany De La Fuente MD [facility] Maternal Fetal Medicine [facility]        Today's Diagnoses     Monochorionic diamniotic twin gestation in second trimester    -  1    Polyhydramnios in second trimester, fetus 1 of multiple gestation        Cervical shortening affecting pregnancy in second trimester           Follow-ups after your visit        Your next 10 appointments already scheduled     Jul 27, 2018 11:45 AM CDT   MFM US COMPRE TWINS F/U with [id number]   [facility] Maternal Fetal Medicine Ultrasound - [facility] ([facility])    [address]  [facility] [address] [phone]           Wear comfortable clothes and leave your valuables at home.            Jul 27, 2018 12:15 PM CDT   Radiology MD with  BLANCA PALUMBO   [facility] Maternal Fetal Medicine [facility])    [address]  [facility] [address]   [phone]           Please arrive at the time given for your first appointment. This visit is used internally to schedule the physician's time during your ultrasound.            Jul 31, 2018  8:00 AM CDT   MFM US COMPRE TWINS F/U with RHMFMUSR3   [facility] Maternal Fetal Medicine Ultrasound - [facility] ([facility])    [address]  [facility] [address]   [phone]           Wear comfortable clothes and leave your valuables at home.            Jul 31, 2018  8:30 AM CDT   Radiology MD with [facility]   [facility] Maternal Fetal Medicine [facility] ([facility])    [address]  [facility] [address]   [phone]           Please arrive at the time given for your first appointment. This visit is used internally to schedule the physician's time during your  ultrasound.            Aug 03, 2018  8:00 AM CDT   MFM US COMPRE TWINS F/U with RHMFMUSR2   [facility] Maternal Fetal Medicine Ultrasound - [facility] ([facility])    [address]  [facility] [address] [phone]           Wear comfortable clothes and leave your valuables at home.            Aug 03, 2018  8:30 AM CDT   Radiology MD with RH BLANCA PALUMBO   [facility] Maternal Fetal Medicine - [facility] ([facility])    [address]  [facility] [address] [phone]           Please arrive at the time given for your first appointment. This visit is used internally to schedule the physician's time during your ultrasound.            Aug 07, 2018  8:00 AM CDT   MFM US COMPRE TWINS F/U with RHMFMUSR1   [facility] - [facility] ([facility])    [address] [place] [address] [phone]           Wear comfortable clothes and leave your valuables at home.            Aug 07, 2018  8:30 AM CDT   Radiology MD with JOSEF RIOS MD   [facility] Maternal Fetal Medicine [facility] ([facility])    [address] [place] [address] [phone]           Please arrive at the time given for your first appointment. This visit is used internally to schedule the physician's time during your ultrasound.            Aug 10, 2018  3:00 PM CDT   MFM US COMPRE TWINS F/U with RHMFMUSR2   [facility] Maternal Fetal Medicine Ultrasound - [facility] ([facility])    [address]  [facility] [address]   [phone]           Wear comfortable clothes and leave your valuables at home.            Aug 10, 2018  3:30 PM CDT   Radiology MD with RH BLANCA PALUMBO   [facility] Maternal Fetal Medicine [facility] ([facility])    [address]  [facility] [address]   [phone]           Please arrive at the time given for your first appointment. This  visit is used internally to schedule the physician's time during your ultrasound.              Who to contact     If you have questions or need follow up information about today's clinic visit or your schedule please contact [facility] MATERNAL FETAL MEDICINE - [facility] directly at [phone].  Normal or non-critical lab and imaging results will be communicated to you by MyChart, letter or phone within 4 business days after the clinic has received the results. If you do not hear from us within 7 days, please contact the clinic through [employer] or phone. If you have a critical or abnormal lab result, we will notify you by phone as soon as possible.  Submit refill requests through [employer] or call your pharmacy and they will forward the refill request to us. Please allow 3 business days for your refill to be completed.          Additional Information About Your Visit        MyChart Information     [employer] gives you secure access to your electronic health record. If you see a primary care provider, you can also send messages to your care team and make appointments. If you have questions, please call your primary care clinic.  If you do not have a primary care provider, please call [phone] and they will assist you.        Care EveryWhere ID     This is your Care EveryWhere ID. This could be used by other organizations to access your [place] medical records  [id number]        Your Vitals Were     Last Period                   01/31/2018            Blood Pressure from Last 3 Encounters:   07/03/18 115/70    Weight from Last 3 Encounters:   No data found for Wt              Today, you had the following     No orders found for display       Primary Care Provider    None Specified       No primary provider on file.        Equal Access to Services     SONIA VICENTE : juany Baig, evan espinal. So [facility] [phone].    ATENCIÓN: Si  gigi romero, tiene a luciano disposición servicios gratuitos de asistencia lingüística. Stephen cross [phone].    We comply with applicable federal civil rights laws and [place] laws. We do not discriminate on the basis of race, color, national origin, age, disability, sex, sexual orientation, or gender identity.            Thank you!     Thank you for choosing [facility] - [facility]  for your care. Our goal is always to provide you with excellent care. Hearing back from our patients is one way we can continue to improve our services. Please take a few minutes to complete the written survey that you may receive in the mail after your visit with us. Thank you!             Your Updated Medication List - Protect others around you: Learn how to safely use, store and throw away your medicines at www.disposemymeds.org.      Notice  As of 7/24/2018  9:18 PM    You have not been prescribed any medications.

## 2018-07-25 NOTE — PROGRESS NOTES
Please see ultrasound report under imaging tab for details on ultrasound performed today.    Tiffany De La Fuente MD  , OB/GYN  Maternal-Fetal Medicine  [email or web address]  [phone] (Academic office)  [phone] (Pager)

## 2018-07-27 ENCOUNTER — OFFICE VISIT (OUTPATIENT)
Dept: MATERNAL FETAL MEDICINE | Facility: CLINIC | Age: 38
End: 2018-07-27
Attending: OBSTETRICS & GYNECOLOGY
Payer: COMMERCIAL

## 2018-07-27 ENCOUNTER — HOSPITAL ENCOUNTER (OUTPATIENT)
Dept: ULTRASOUND IMAGING | Facility: CLINIC | Age: 38
Discharge: HOME | End: 2018-07-27
Attending: OBSTETRICS & GYNECOLOGY | Admitting: OBSTETRICS & GYNECOLOGY
Payer: COMMERCIAL

## 2018-07-27 DIAGNOSIS — O30.039 MONOCHORIONIC DIAMNIOTIC TWIN PREGNANCY, ANTEPARTUM: ICD-10-CM

## 2018-07-27 DIAGNOSIS — O30.032 MONOCHORIONIC DIAMNIOTIC TWIN GESTATION IN SECOND TRIMESTER: Primary | ICD-10-CM

## 2018-07-27 DIAGNOSIS — O40.2XX1 POLYHYDRAMNIOS IN SECOND TRIMESTER, FETUS 1 OF MULTIPLE GESTATION: ICD-10-CM

## 2018-07-27 PROCEDURE — 76820 UMBILICAL ARTERY ECHO: CPT | Performed by: OBSTETRICS & GYNECOLOGY

## 2018-07-27 PROCEDURE — 76816 OB US FOLLOW-UP PER FETUS: CPT | Mod: 59

## 2018-07-27 NOTE — PROGRESS NOTES
"Please see \"Imaging\" tab under \"Chart Review\" for details of today's US at the [facility].    Mannie Yeboah MD  Maternal-Fetal Medicine    "

## 2018-07-27 NOTE — MR AVS SNAPSHOT
After Visit Summary   7/27/2018    Soni Lo    MRN: [id number]           Patient Information     Date Of Birth          1980        Visit Information        Provider Department      7/27/2018 12:15 PM Mannie Yeboah MD [facility] Maternal Fetal Medicine [facility]        Today's Diagnoses     Monochorionic diamniotic twin gestation in second trimester    -  1    Polyhydramnios in second trimester, fetus 1 of multiple gestation           Follow-ups after your visit        Your next 10 appointments already scheduled     Jul 31, 2018  8:00 AM CDT   MFM US COMPRE TWINS F/U with [id number]   [facility] Maternal Fetal Medicine Ultrasound - [facility] ([facility])    [address]  [facility] [address] [phone]           Wear comfortable clothes and leave your valuables at home.            Jul 31, 2018  8:30 AM CDT   Radiology MD with JOSEF RIOS MD   [facility] Fetal [facility] ([facility])    [address]  [facility] [address] [phone]           Please arrive at the time given for your first appointment. This visit is used internally to schedule the physician's time during your ultrasound.            Aug 03, 2018  8:00 AM CDT   MFM US COMPRE TWINS F/U with [id number]   [facility] Maternal Fetal Medicine Ultrasound - [facility] ([facility])    [address]  [facility] [address] [phone]           Wear comfortable clothes and leave your valuables at home.            Aug 03, 2018  8:30 AM CDT   Radiology MD with  BLANCA PALUMBO   [facility] Maternal Fetal Medicine [facility] ([facility])    [address]  [facility] [address]   [phone]           Please arrive at the time given for your first appointment. This visit is used internally to schedule the physician's time during your ultrasound.            Aug 07, 2018  8:00 AM CDT   MFM US COMPRE TWINS  F/U with RHMFMUSR1   [facility] - [facility] ([facility])    [address]  [facility] [id number] [phone]           Wear comfortable clothes and leave your valuables at home.            Aug 07, 2018  8:30 AM CDT   Radiology MD with RH BLANCA PALUMBO   [facility] Maternal Fetal Medicine - [facility] ([facility])    [address]  [facility] [address] [phone]           Please arrive at the time given for your first appointment. This visit is used internally to schedule the physician's time during your ultrasound.            Aug 10, 2018  3:00 PM CDT   MFM US COMPRE TWINS F/U with RHMFMUSR2   [facility] Maternal Fetal Medicine Ultrasound - [facility] ([facility])    [address]  [facility] [address] [phone]           Wear comfortable clothes and leave your valuables at home.            Aug 10, 2018  3:30 PM CDT   Radiology MD with JOSEF RIOS MD   [facility] Maternal Fetal Medicine [facility] ([facility])    [address]  [facility] [address] [phone]           Please arrive at the time given for your first appointment. This visit is used internally to schedule the physician's time during your ultrasound.            Aug 14, 2018  8:00 AM CDT   MFM US COMPRE TWINS F/U with RHMFMUSR2   [facility] Maternal Fetal Medicine Ultrasound - [facility] ([facility])    [address]  [facility] [address] [phone]           Wear comfortable clothes and leave your valuables at home.            Aug 14, 2018  8:30 AM CDT   Radiology MD with RH BLANCA PALUMBO   [facility] Maternal Fetal Medicine [facility] ([facility])    [address]  [facility] [address] [phone]           Please arrive at the time given for your first appointment. This visit is used internally to schedule the physician's time during your  ultrasound.              Who to contact     If you have questions or need follow up information about today's clinic visit or your schedule please contact [facility] MATERNAL FETAL MEDICINE [facility] directly at [phone].  Normal or non-critical lab and imaging results will be communicated to you by MyChart, letter or phone within 4 business days after the clinic has received the results. If you do not hear from us within 7 days, please contact the clinic through MyChart or phone. If you have a critical or abnormal lab result, we will notify you by phone as soon as possible.  Submit refill requests through [employer] or call your pharmacy and they will forward the refill request to us. Please allow 3 business days for your refill to be completed.          Additional Information About Your Visit        [employer] Information     [employer] gives you secure access to your electronic health record. If you see a primary care provider, you can also send messages to your care team and make appointments. If you have questions, please call your primary care clinic.  If you do not have a primary care provider, please call [phone] and they will assist you.        Care EveryWhere ID     This is your Care EveryWhere ID. This could be used by other organizations to access your [place] medical records  [id number]        Your Vitals Were     Last Period                   01/31/2018            Blood Pressure from Last 3 Encounters:   07/03/18 115/70    Weight from Last 3 Encounters:   No data found for Wt              Today, you had the following     No orders found for display       Primary Care Provider    None Specified       No primary provider on file.        Equal Access to Services     [facility]: Joao Erickson, juany jorge, evan espinal . So [facility] [phone].    ATENCIÓN: Si habla español, tiene a luciano disposición servicios gratuitos de asistencia  lingüísticaMinnie Mitchell al [phone].    We comply with applicable federal civil rights laws and [place] laws. We do not discriminate on the basis of race, color, national origin, age, disability, sex, sexual orientation, or gender identity.            Thank you!     Thank you for choosing [facility]  for your care. Our goal is always to provide you with excellent care. Hearing back from our patients is one way we can continue to improve our services. Please take a few minutes to complete the written survey that you may receive in the mail after your visit with us. Thank you!             Your Updated Medication List - Protect others around you: Learn how to safely use, store and throw away your medicines at www.disposemymeds.org.      Notice  As of 7/27/2018 12:55 PM    You have not been prescribed any medications.

## 2018-07-31 ENCOUNTER — OFFICE VISIT (OUTPATIENT)
Dept: MATERNAL FETAL MEDICINE | Facility: CLINIC | Age: 38
End: 2018-07-31
Attending: OBSTETRICS & GYNECOLOGY
Payer: COMMERCIAL

## 2018-07-31 ENCOUNTER — HOSPITAL ENCOUNTER (OUTPATIENT)
Dept: ULTRASOUND IMAGING | Facility: CLINIC | Age: 38
Discharge: HOME | End: 2018-07-31
Attending: OBSTETRICS & GYNECOLOGY | Admitting: OBSTETRICS & GYNECOLOGY
Payer: COMMERCIAL

## 2018-07-31 DIAGNOSIS — O26.872 CERVICAL SHORTENING AFFECTING PREGNANCY IN SECOND TRIMESTER: ICD-10-CM

## 2018-07-31 DIAGNOSIS — O40.2XX1 POLYHYDRAMNIOS IN SECOND TRIMESTER, FETUS 1 OF MULTIPLE GESTATION: ICD-10-CM

## 2018-07-31 DIAGNOSIS — O30.032 MONOCHORIONIC DIAMNIOTIC TWIN GESTATION IN SECOND TRIMESTER: Primary | ICD-10-CM

## 2018-07-31 DIAGNOSIS — O30.032 MONOCHORIONIC DIAMNIOTIC TWIN GESTATION IN SECOND TRIMESTER: ICD-10-CM

## 2018-07-31 PROCEDURE — 76817 TRANSVAGINAL US OBSTETRIC: CPT | Performed by: OBSTETRICS & GYNECOLOGY

## 2018-07-31 PROCEDURE — 76816 OB US FOLLOW-UP PER FETUS: CPT | Mod: 59

## 2018-07-31 NOTE — MR AVS SNAPSHOT
After Visit Summary   7/31/2018    Soni Lo    MRN: [id number]           Patient Information     Date Of Birth          1980        Visit Information        Provider Department      7/31/2018 8:30 AM Tiffany De La Fuente MD [facility] Maternal Fetal Medicine [facility]        Today's Diagnoses     Monochorionic diamniotic twin gestation in second trimester    -  1    Polyhydramnios in second trimester, fetus 1 of multiple gestation        Cervical shortening affecting pregnancy in second trimester           Follow-ups after your visit        Your next 10 appointments already scheduled     Aug 03, 2018  8:00 AM CDT   MFM US COMPRE TWINS F/U with [id number]   [facility] Maternal Fetal Medicine Ultrasound - [facility] ([facility])    [address]  [facility] [address] [phone]           Wear comfortable clothes and leave your valuables at home.            Aug 03, 2018  8:30 AM CDT   Radiology MD with  BLANCA PALUMBO   [facility] Maternal Fetal Medicine [facility])    [address]  [facility] [address] [phone]           Please arrive at the time given for your first appointment. This visit is used internally to schedule the physician's time during your ultrasound.            Aug 07, 2018  8:00 AM CDT   MFM US COMPRE TWINS F/U with RHMFMUSR1   [facility] Maternal Fetal Medicine Ultrasound - [facility] ([facility])    [address]  [facility] [id number] [phone]           Wear comfortable clothes and leave your valuables at home.            Aug 07, 2018  8:30 AM CDT   Radiology MD with [facility]   [facility] Maternal Fetal Medicine [facility] ([facility])    [address]  [facility] [address] [phone]           Please arrive at the time given for your first appointment. This visit is used internally to schedule the physician's time during your  ultrasound.            Aug 10, 2018  3:00 PM CDT   MFM US COMPRE TWINS F/U with RHMFMUSR2   [facility] Maternal Fetal Medicine Ultrasound - [facility] ([facility])    [address]  [facility] [id number] [phone]           Wear comfortable clothes and leave your valuables at home.            Aug 10, 2018  3:30 PM CDT   Radiology MD with RH BLANCA PALUMBO   [facility] Maternal Fetal Medicine [facility] ([facility])    [address]  [facility] [address] [phone]           Please arrive at the time given for your first appointment. This visit is used internally to schedule the physician's time during your ultrasound.            Aug 14, 2018  8:00 AM CDT   MFM US COMPRE TWINS F/U with RHMFMUSR2   [facility] Maternal Fetal Medicine Ultrasound - [facility] ([facility])    [address] [place] [address] [phone]           Wear comfortable clothes and leave your valuables at home.            Aug 14, 2018  8:30 AM CDT   Radiology MD with JOSEF RIOS MD   [facility] Maternal Fetal Medicine [facility] ([facility])    [address] [place] [address] [phone]           Please arrive at the time given for your first appointment. This visit is used internally to schedule the physician's time during your ultrasound.            Aug 17, 2018  3:00 PM CDT   MFM US COMPRE TWINS F/U with RHMFMUSR3   [facility] Maternal Fetal Medicine Ultrasound - [facility] ([facility])    [address]  [facility] [address] [phone]           Wear comfortable clothes and leave your valuables at home.            Aug 17, 2018  3:30 PM CDT   Radiology MD with RH BLANCA PALUMBO   [facility] Maternal Fetal Medicine [facility] ([facility])    [address]  [facility] [address]   [phone]           Please arrive at the time given for your first appointment. This  visit is used internally to schedule the physician's time during your ultrasound.              Who to contact     If you have questions or need follow up information about today's clinic visit or your schedule please contact [facility] MATERNAL FETAL MEDICINE - [facility] directly at [phone].  Normal or non-critical lab and imaging results will be communicated to you by MyChart, letter or phone within 4 business days after the clinic has received the results. If you do not hear from us within 7 days, please contact the clinic through [employer] or phone. If you have a critical or abnormal lab result, we will notify you by phone as soon as possible.  Submit refill requests through [employer] or call your pharmacy and they will forward the refill request to us. Please allow 3 business days for your refill to be completed.          Additional Information About Your Visit        MyChart Information     [employer] gives you secure access to your electronic health record. If you see a primary care provider, you can also send messages to your care team and make appointments. If you have questions, please call your primary care clinic.  If you do not have a primary care provider, please call [phone] and they will assist you.        Care EveryWhere ID     This is your Care EveryWhere ID. This could be used by other organizations to access your [place] medical records  [id number]        Your Vitals Were     Last Period                   01/31/2018            Blood Pressure from Last 3 Encounters:   07/03/18 115/70    Weight from Last 3 Encounters:   No data found for Wt              Today, you had the following     No orders found for display       Primary Care Provider    None Specified       No primary provider on file.        Equal Access to Services     SONIA VICENTE : juany Baig, evan espinal. So [facility] [phone].    ATENCIÓN: Si  gigi romero, tiene a luciano disposición servicios gratuitos de asistencia lingüística. Stephen cross [phone].    We comply with applicable federal civil rights laws and [place] laws. We do not discriminate on the basis of race, color, national origin, age, disability, sex, sexual orientation, or gender identity.            Thank you!     Thank you for choosing [facility] - [facility]  for your care. Our goal is always to provide you with excellent care. Hearing back from our patients is one way we can continue to improve our services. Please take a few minutes to complete the written survey that you may receive in the mail after your visit with us. Thank you!             Your Updated Medication List - Protect others around you: Learn how to safely use, store and throw away your medicines at www.disposemymeds.org.      Notice  As of 7/31/2018  9:28 AM    You have not been prescribed any medications.

## 2018-07-31 NOTE — PROGRESS NOTES
Please see ultrasound report under imaging tab for details on ultrasound performed today.    Tiffany De La Fuente MD  , OB/GYN  Maternal-Fetal Medicine  [email or web address]  [phone] (Academic office)  [phone] (Pager)

## 2018-08-03 ENCOUNTER — OFFICE VISIT (OUTPATIENT)
Dept: MATERNAL FETAL MEDICINE | Facility: CLINIC | Age: 38
End: 2018-08-03
Attending: OBSTETRICS & GYNECOLOGY
Payer: COMMERCIAL

## 2018-08-03 ENCOUNTER — HOSPITAL ENCOUNTER (OUTPATIENT)
Dept: ULTRASOUND IMAGING | Facility: CLINIC | Age: 38
Discharge: HOME | End: 2018-08-03
Attending: OBSTETRICS & GYNECOLOGY | Admitting: OBSTETRICS & GYNECOLOGY
Payer: COMMERCIAL

## 2018-08-03 DIAGNOSIS — O30.032 MONOCHORIONIC DIAMNIOTIC TWIN GESTATION IN SECOND TRIMESTER: Primary | ICD-10-CM

## 2018-08-03 DIAGNOSIS — O30.032 MONOCHORIONIC DIAMNIOTIC TWIN GESTATION IN SECOND TRIMESTER: ICD-10-CM

## 2018-08-03 PROCEDURE — 76820 UMBILICAL ARTERY ECHO: CPT | Mod: 59 | Performed by: OBSTETRICS & GYNECOLOGY

## 2018-08-03 PROCEDURE — 76816 OB US FOLLOW-UP PER FETUS: CPT

## 2018-08-03 NOTE — MR AVS SNAPSHOT
After Visit Summary   8/3/2018    Soni Lo    MRN: [id number]           Patient Information     Date Of Birth          1980        Visit Information        Provider Department      8/3/2018 8:30 AM Kristan Andrade,  [facility] Maternal Fetal Medicine [facility]        Today's Diagnoses     Monochorionic diamniotic twin gestation in second trimester    -  1       Follow-ups after your visit        Your next 10 appointments already scheduled     Aug 07, 2018  8:00 AM CDT   MFM US COMPRE TWINS F/U with RHMFMUSR1   [facility]ound - [facility] ([facility])    [address]  [facility] [address] [phone]           Wear comfortable clothes and leave your valuables at home.            Aug 07, 2018  8:30 AM CDT   Radiology MD with  BLANCA PALUMBO   [facility] Maternal Fetal Medicine [facility] ([facility])    [address]  [facility] [address] [phone]           Please arrive at the time given for your first appointment. This visit is used internally to schedule the physician's time during your ultrasound.            Aug 10, 2018  3:00 PM CDT   MFM US COMPRE TWINS F/U with RHMFMUSR2   [facility] Maternal Fetal Medicine Ultrasound - [facility] ([facility])    [address]  [facility] [address] [phone]           Wear comfortable clothes and leave your valuables at home.            Aug 10, 2018  3:30 PM CDT   [facility]iology [facility]   [facility] Maternal Fetal Medicine [facility] ([facility])    [address]  [facility] [address] [phone]           Please arrive at the time given for your first appointment. This visit is used internally to schedule the physician's time during your ultrasound.            Aug 14, 2018  8:00 AM CDT   MFM US COMPRE TWINS F/U with RHMFMUSR2   [facility] Maternal Fetal Medicine Ultrasound [facility]  ([facility])    [address]  [facility] [address]   [phone]           Wear comfortable clothes and leave your valuables at home.            Aug 14, 2018  8:30 AM CDT   Radiology MD with RH BLANCA PALUMBO   [facility] Maternal Fetal Medicine - [facility] ([facility])    [address]  [facility] [address]   [phone]           Please arrive at the time given for your first appointment. This visit is used internally to schedule the physician's time during your ultrasound.            Aug 17, 2018  3:00 PM CDT   MFM US COMPRE TWINS F/U with RHMFMUSR3   [facility] Maternal Fetal Medicine Ultrasound - [facility] ([facility])    [address]  [facility] [address]   [phone]           Wear comfortable clothes and leave your valuables at home.            Aug 17, 2018  3:30 PM CDT   Radiology MD with  BLANCA PALUMBO   [facility] Maternal Fetal Medicine [facility] ([facility])    [address]  [facility] [address]   [phone]           Please arrive at the time given for your first appointment. This visit is used internally to schedule the physician's time during your ultrasound.            Aug 21, 2018  8:00 AM CDT   MFM US COMPRE TWINS F/U with RHMFMUSR2   [facility] Maternal Fetal Medicine Ultrasound - [facility] ([facility])    [address]  [facility] [address]   [phone]           Wear comfortable clothes and leave your valuables at home.            Aug 21, 2018  8:30 AM CDT   Radiology MD with  BLANCA PALUMBO   [facility] Maternal Fetal Medicine [facility] ([facility])    [address]  [facility] [address]   [phone]           Please arrive at the time given for your first appointment. This visit is used internally to schedule the physician's time during your ultrasound.              Who to contact     If you have questions or need  follow up information about today's clinic visit or your schedule please contact [facility] directly at [phone].  Normal or non-critical lab and imaging results will be communicated to you by MyChart, letter or phone within 4 business days after the clinic has received the results. If you do not hear from us within 7 days, please contact the clinic through [facility] or phone. If you have a critical or abnormal lab result, we will notify you by phone as soon as possible.  Submit refill requests through Eachpal or call your pharmacy and they will forward the refill request to us. Please allow 3 business days for your refill to be completed.          Additional Information About Your Visit        [facility] Information     Eachpal gives you secure access to your electronic health record. If you see a primary care provider, you can also send messages to your care team and make appointments. If you have questions, please call your primary care clinic.  If you do not have a primary care provider, please call [phone] and they will assist you.        Care EveryWhere ID     This is your Care EveryWhere ID. This could be used by other organizations to access your [place] medical records  [id number]        Your Vitals Were     Last Period                   01/31/2018            Blood Pressure from Last 3 Encounters:   07/03/18 115/70    Weight from Last 3 Encounters:   No data found for Wt              Today, you had the following     No orders found for display       Primary Care Provider    None Specified       No primary provider on file.        Equal Access to Services     SONIA VICENTE : Hadii sonia Erickson, juany jorge, evan espinal . So [facility] [phone].    ATENCIÓN: Si habla español, tiene a luciano disposición servicios gratuitos de asistencia lingüística. Llame al [phone].    We comply with applicable federal  civil rights laws and [place] laws. We do not discriminate on the basis of race, color, national origin, age, disability, sex, sexual orientation, or gender identity.            Thank you!     Thank you for choosing [facility]  for your care. Our goal is always to provide you with excellent care. Hearing back from our patients is one way we can continue to improve our services. Please take a few minutes to complete the written survey that you may receive in the mail after your visit with us. Thank you!             Your Updated Medication List - Protect others around you: Learn how to safely use, store and throw away your medicines at www.disposemymeds.org.      Notice  As of 8/3/2018 12:03 PM    You have not been prescribed any medications.

## 2018-08-07 ENCOUNTER — OFFICE VISIT (OUTPATIENT)
Dept: MATERNAL FETAL MEDICINE | Facility: CLINIC | Age: 38
End: 2018-08-07
Attending: OBSTETRICS & GYNECOLOGY
Payer: COMMERCIAL

## 2018-08-07 ENCOUNTER — HOSPITAL ENCOUNTER (OUTPATIENT)
Dept: ULTRASOUND IMAGING | Facility: CLINIC | Age: 38
Discharge: HOME | End: 2018-08-07
Attending: OBSTETRICS & GYNECOLOGY | Admitting: OBSTETRICS & GYNECOLOGY
Payer: COMMERCIAL

## 2018-08-07 DIAGNOSIS — O30.032 MONOCHORIONIC DIAMNIOTIC TWIN GESTATION IN SECOND TRIMESTER: Primary | ICD-10-CM

## 2018-08-07 DIAGNOSIS — O40.2XX1 POLYHYDRAMNIOS IN SECOND TRIMESTER, FETUS 1 OF MULTIPLE GESTATION: ICD-10-CM

## 2018-08-07 DIAGNOSIS — O30.032 MONOCHORIONIC DIAMNIOTIC TWIN GESTATION IN SECOND TRIMESTER: ICD-10-CM

## 2018-08-07 PROCEDURE — 76820 UMBILICAL ARTERY ECHO: CPT

## 2018-08-07 PROCEDURE — 76817 TRANSVAGINAL US OBSTETRIC: CPT | Performed by: OBSTETRICS & GYNECOLOGY

## 2018-08-07 PROCEDURE — 76816 OB US FOLLOW-UP PER FETUS: CPT | Mod: 59

## 2018-08-07 PROCEDURE — 76820 UMBILICAL ARTERY ECHO: CPT | Mod: 59

## 2018-08-07 NOTE — PROGRESS NOTES
Please see full imaging report from [employer] program under imaging tab.    Reassuring findings reviewed with Soni and her  today. Will continue with twice weekly assessment given polyhydramnios and abnormal dopplers. She inquired about delivery timing - goal is 34-37 weeks with uncomplicated mono-di twins and if her findings remain as they currently are, I would recommend delivery at 34 weeks. If things improve we might be able to extend pregnancy closer to 37 weeks and they would like to discuss this further if it becomes an option. If findings worsen, they goal delivery would be closer to 32-34 weeks. At this time they plan delivery at [facility] with Dr. Toro.     Angie Hoang MD  Maternal Fetal Medicine

## 2018-08-07 NOTE — MR AVS SNAPSHOT
After Visit Summary   8/7/2018    Soni Lo    MRN: [id number]           Patient Information     Date Of Birth          1980        Visit Information        Provider Department      8/7/2018 8:30 AM Angie Hoang MD [facility] Maternal Fetal Medicine [facility]        Today's Diagnoses     Monochorionic diamniotic twin gestation in second trimester    -  1    Polyhydramnios in second trimester, fetus 1 of multiple gestation           Follow-ups after your visit        Your next 10 appointments already scheduled     Aug 10, 2018  3:00 PM CDT   MFM US COMPRE TWINS F/U with [id number]   [facility] Maternal Fetal Medicine Ultrasound - [facility] ([facility])    [address]  [facility] [address] [phone]           Wear comfortable clothes and leave your valuables at home.            Aug 10, 2018  3:30 PM CDT   Radiology MD with RH BLANCA PALUMBO   [facility] Fetal [facility] ([facility])    [address]  [facility] [address] [phone]           Please arrive at the time given for your first appointment. This visit is used internally to schedule the physician's time during your ultrasound.            Aug 14, 2018  8:00 AM CDT   MFM US COMPRE TWINS F/U with RHMFMUSR2   [facility] Maternal Fetal Medicine Ultrasound - [facility] ([facility])    [address]  [facility] [address] [phone]           Wear comfortable clothes and leave your valuables at home.            Aug 14, 2018  8:30 AM CDT   Radiology MD with  BLANCA PALUMBO   [facility] Fetal Medicine [facility] ([facility])    [address]  [facility] [address]   [phone]           Please arrive at the time given for your first appointment. This visit is used internally to schedule the physician's time during your ultrasound.            Aug 17, 2018  3:00 PM CDT   MFM US COMPRE TWINS F/U  with RHMFMUSR3   [facility] Maternal Fetal Medicine Ultrasound - [facility] ([facility])    [address]  [facility] [address]   [phone]           Wear comfortable clothes and leave your valuables at home.            Aug 17, 2018  3:30 PM CDT   Radiology MD with JOSEF RIOS MD   [facility] Maternal Fetal Medicine - [facility] ([facility])    [address]  [facility] [address]   [phone]           Please arrive at the time given for your first appointment. This visit is used internally to schedule the physician's time during your ultrasound.            Aug 21, 2018  8:00 AM CDT   MFM US COMPRE TWINS F/U with RHMFMUSR2   [facility] Maternal Fetal Medicine Ultrasound - [facility] ([facility])    [address]  [facility] [address]   [phone]           Wear comfortable clothes and leave your valuables at home.            Aug 21, 2018  8:30 AM CDT   Radiology MD with JOSEF RIOS MD   [facility] Maternal Fetal Medicine - [facility] ([facility])    [address]  [facility] [address]   [phone]           Please arrive at the time given for your first appointment. This visit is used internally to schedule the physician's time during your ultrasound.            Aug 24, 2018  2:15 PM CDT   MFM US COMPRE TWINS F/U with RHMFMUSR2   [facility] Maternal Fetal Medicine Ultrasound - [facility] ([facility])    [address]  [facility] [address]   [phone]           Wear comfortable clothes and leave your valuables at home.            Aug 24, 2018  2:45 PM CDT   Radiology MD with JOSEF RIOS MD   [facility] Maternal Fetal Medicine [facility] ([facility])    [address]  [facility] [address]   [phone]           Please arrive at the time given for your first appointment. This visit is used internally to schedule the physician's time during your  ultrasound.              Who to contact     If you have questions or need follow up information about today's clinic visit or your schedule please contact [facility] MATERNAL FETAL MEDICINE [facility] directly at [phone].  Normal or non-critical lab and imaging results will be communicated to you by MyChart, letter or phone within 4 business days after the clinic has received the results. If you do not hear from us within 7 days, please contact the clinic through MyChart or phone. If you have a critical or abnormal lab result, we will notify you by phone as soon as possible.  Submit refill requests through classmarkets or call your pharmacy and they will forward the refill request to us. Please allow 3 business days for your refill to be completed.          Additional Information About Your Visit        [employer] Information     [employer]markets gives you secure access to your electronic health record. If you see a primary care provider, you can also send messages to your care team and make appointments. If you have questions, please call your primary care clinic.  If you do not have a primary care provider, please call [phone] and they will assist you.        Care EveryWhere ID     This is your Care EveryWhere ID. This could be used by other organizations to access your [place] medical records  [id number]        Your Vitals Were     Last Period                   01/31/2018            Blood Pressure from Last 3 Encounters:   07/03/18 115/70    Weight from Last 3 Encounters:   No data found for Wt              Today, you had the following     No orders found for display       Primary Care Provider    None Specified       No primary provider on file.        Equal Access to Services     [facility]: Joao Erickson, juany jorge, evan espinal . So [facility] [phone].    ATENCIÓN: Si habla español, tiene a luciano disposición servicios gratuitos de asistencia  lingüísticaMinnie Mitchell al [phone].    We comply with applicable federal civil rights laws and [place] laws. We do not discriminate on the basis of race, color, national origin, age, disability, sex, sexual orientation, or gender identity.            Thank you!     Thank you for choosing [facility]  for your care. Our goal is always to provide you with excellent care. Hearing back from our patients is one way we can continue to improve our services. Please take a few minutes to complete the written survey that you may receive in the mail after your visit with us. Thank you!             Your Updated Medication List - Protect others around you: Learn how to safely use, store and throw away your medicines at www.disposemymeds.org.      Notice  As of 8/7/2018  9:18 AM    You have not been prescribed any medications.

## 2018-08-10 ENCOUNTER — HOSPITAL ENCOUNTER (OUTPATIENT)
Dept: ULTRASOUND IMAGING | Facility: CLINIC | Age: 38
Discharge: HOME | End: 2018-08-10
Attending: OBSTETRICS & GYNECOLOGY | Admitting: OBSTETRICS & GYNECOLOGY
Payer: COMMERCIAL

## 2018-08-10 ENCOUNTER — OFFICE VISIT (OUTPATIENT)
Dept: MATERNAL FETAL MEDICINE | Facility: CLINIC | Age: 38
End: 2018-08-10
Attending: OBSTETRICS & GYNECOLOGY
Payer: COMMERCIAL

## 2018-08-10 DIAGNOSIS — O30.032 MONOCHORIONIC DIAMNIOTIC TWIN GESTATION IN SECOND TRIMESTER: Primary | ICD-10-CM

## 2018-08-10 DIAGNOSIS — O30.032 MONOCHORIONIC DIAMNIOTIC TWIN GESTATION IN SECOND TRIMESTER: ICD-10-CM

## 2018-08-10 PROCEDURE — 76820 UMBILICAL ARTERY ECHO: CPT | Performed by: OBSTETRICS & GYNECOLOGY

## 2018-08-10 PROCEDURE — 76816 OB US FOLLOW-UP PER FETUS: CPT

## 2018-08-10 NOTE — PROGRESS NOTES
"Please see \"Imaging\" tab under Chart Review for full details.    Ginna Palmer MD  Maternal Fetal Medicine    "

## 2018-08-10 NOTE — MR AVS SNAPSHOT
After Visit Summary   8/10/2018    Soni Lo    MRN: [id number]           Patient Information     Date Of Birth          1980        Visit Information        Provider Department      8/10/2018 3:30 PM Ginna Palmer MD [facility] Maternal Fetal Medicine [facility]        Today's Diagnoses     Monochorionic diamniotic twin gestation in second trimester    -  1       Follow-ups after your visit        Your next 10 appointments already scheduled     Aug 14, 2018  8:00 AM CDT   MFM US COMPRE TWINS F/U with [id number]   [facility] Maternal Fetal Medicine Ultrasound - [facility] ([facility])    [address]  [facility] [id number] [phone]           Wear comfortable clothes and leave your valuables at home.            Aug 14, 2018  8:30 AM CDT   Radiology MD with  BLANCA PALUMBO   [facility] ([facility])    [address] [facility] [address]  [facility] [address] [phone]           Please arrive at the time given for your first appointment. This visit is used internally to schedule the physician's time during your ultrasound.            Aug 17, 2018  3:00 PM CDT   MFM US COMPRE TWINS F/U with RHMFMUSR3   [facility] Maternal Fetal Medicine Ultrasound - [facility] ([facility])    [address]  [facility] [id number] [phone]           Wear comfortable clothes and leave your valuables at home.            Aug 17, 2018  3:30 PM CDT   Radiology MD with  BLANCA PALUMBO   [facility] ([facility])    [address]  [facility] [address] [phone]           Please arrive at the time given for your first appointment. This visit is used internally to schedule the physician's time during your ultrasound.            Aug 21, 2018  8:00 AM CDT   MFM US COMPRE TWINS F/U with [id number]   [facility] Maternal Fetal Medicine Ultrasound - [facility] ([facility])    [address]  [facility] [address]   [phone]           Wear comfortable clothes and leave your valuables at home.            Aug 21, 2018  8:30 AM CDT   Radiology MD with RH BLANCA PALUMBO   [facility] Maternal Fetal Medicine - [facility] ([facility])    [address]  [facility] [address]   [phone]           Please arrive at the time given for your first appointment. This visit is used internally to schedule the physician's time during your ultrasound.            Aug 24, 2018  2:15 PM CDT   MFM US COMPRE TWINS F/U with RHMFMUSR2   [facility]etal [facility] Ultrasound - [facility] ([facility])    [address]  [facility] [address]   [phone]           Wear comfortable clothes and leave your valuables at home.            Aug 24, 2018  2:45 PM CDT   Radiology MD with JOSEF RIOS MD   [facility] Maternal Fetal Medicine [facility])    [address]  [facility] [address]   [phone]           Please arrive at the time given for your first appointment. This visit is used internally to schedule the physician's time during your ultrasound.            Aug 28, 2018  8:00 AM CDT   MFM US COMPRE TWINS F/U with RHMFMUSR3   [facility] Maternal Fetal Medicine Ultrasound - [facility] ([facility])    [address]  [facility] [address]   [phone]           Wear comfortable clothes and leave your valuables at home.            Aug 28, 2018  8:30 AM CDT   Radiology MD with RH BLANCA PALUMBO   [facility] Maternal Fetal Medicine [facility] ([facility])    [address]  [facility] [address]   [phone]           Please arrive at the time given for your first appointment. This visit is used internally to schedule the physician's time during your ultrasound.              Who to contact     If you have questions or need follow up  information about today's clinic visit or your schedule please contact [facility] directly at [phone].  Normal or non-critical lab and imaging results will be communicated to you by IO.comhart, letter or phone within 4 business days after the clinic has received the results. If you do not hear from us within 7 days, please contact the clinic through IO.comhart or phone. If you have a critical or abnormal lab result, we will notify you by phone as soon as possible.  Submit refill requests through Symbiot[employer] Pharmalab or call your pharmacy and they will forward the refill request to us. Please allow 3 business days for your refill to be completed.          Additional Information About Your Visit        IO.comharLas traperas Information     S[employer] Pharmalab gives you secure access to your electronic health record. If you see a primary care provider, you can also send messages to your care team and make appointments. If you have questions, please call your primary care clinic.  If you do not have a primary care provider, please call [phone] and they will assist you.        Care EveryWhere ID     This is your Care EveryWhere ID. This could be used by other organizations to access your [facility] medical records  [id number]        Your Vitals Were     Last Period                   01/31/2018            Blood Pressure from Last 3 Encounters:   07/03/18 115/70    Weight from Last 3 Encounters:   No data found for Wt              Today, you had the following     No orders found for display       Primary Care Provider    None Specified       No primary provider on file.        Equal Access to Services     SONIA VICENTE : Joao Erickson, juany jorge, qaybevan eubanks . So [facility] [phone].    ATENCIÓN: Si habla español, tiene a luciano disposición servicios gratuitos de asistencia lingüística. Stephen al [phone].    We comply with applicable federal civil rights  laws and [place] laws. We do not discriminate on the basis of race, color, national origin, age, disability, sex, sexual orientation, or gender identity.            Thank you!     Thank you for choosing [facility]  for your care. Our goal is always to provide you with excellent care. Hearing back from our patients is one way we can continue to improve our services. Please take a few minutes to complete the written survey that you may receive in the mail after your visit with us. Thank you!             Your Updated Medication List - Protect others around you: Learn how to safely use, store and throw away your medicines at www.disposemymeds.org.      Notice  As of 8/10/2018  3:40 PM    You have not been prescribed any medications.

## 2018-08-14 ENCOUNTER — OFFICE VISIT (OUTPATIENT)
Dept: MATERNAL FETAL MEDICINE | Facility: CLINIC | Age: 38
End: 2018-08-14
Attending: OBSTETRICS & GYNECOLOGY
Payer: COMMERCIAL

## 2018-08-14 ENCOUNTER — HOSPITAL ENCOUNTER (OUTPATIENT)
Dept: ULTRASOUND IMAGING | Facility: CLINIC | Age: 38
Discharge: HOME | End: 2018-08-14
Attending: OBSTETRICS & GYNECOLOGY | Admitting: OBSTETRICS & GYNECOLOGY
Payer: COMMERCIAL

## 2018-08-14 DIAGNOSIS — O30.032 MONOCHORIONIC DIAMNIOTIC TWIN GESTATION IN SECOND TRIMESTER: ICD-10-CM

## 2018-08-14 DIAGNOSIS — O30.032 MONOCHORIONIC DIAMNIOTIC TWIN GESTATION IN SECOND TRIMESTER: Primary | ICD-10-CM

## 2018-08-14 PROCEDURE — 76820 UMBILICAL ARTERY ECHO: CPT | Performed by: OBSTETRICS & GYNECOLOGY

## 2018-08-14 PROCEDURE — 76816 OB US FOLLOW-UP PER FETUS: CPT

## 2018-08-14 NOTE — MR AVS SNAPSHOT
After Visit Summary   8/14/2018    Soni Lo    MRN: [id number]           Patient Information     Date Of Birth          1980        Visit Information        Provider Department      8/14/2018 8:30 AM Marnie Rosenbaum MD [facility] Maternal Fetal Medicine [facility]        Today's Diagnoses     Monochorionic diamniotic twin gestation in second trimester    -  1       Follow-ups after your visit        Your next 10 appointments already scheduled     Aug 17, 2018  3:00 PM CDT   MFM US COMPRE TWINS F/U with [id number]   [facility] Maternal Fetal Medicine Ultrasound - [facility] ([facility])    [address]  [facility] [address] [phone]           Wear comfortable clothes and leave your valuables at home.            Aug 17, 2018  3:30 PM CDT   Radiology MD with  BLANCA PALUMBO   [facility] Maternal Fetal Medicine [facility] ([facility])    [address]  [facility] [id number] [phone]           Please arrive at the time given for your first appointment. This visit is used internally to schedule the physician's time during your ultrasound.            Aug 21, 2018  8:00 AM CDT   MFM US COMPRE TWINS F/U with RHMFMUSR2   [facility] Maternal Fetal Medicine Ultrasound - [facility] ([facility])    [address]  [facility] [address] [phone]           Wear comfortable clothes and leave your valuables at home.            Aug 21, 2018  8:30 AM CDT   [facility]   [facility] Maternal Fetal Medicine [facility] ([facility])    [address]  [facility] [address] [phone]           Please arrive at the time given for your first appointment. This visit is used internally to schedule the physician's time during your ultrasound.            Aug 24, 2018  2:15 PM CDT   MFM US COMPRE TWINS F/U with [email or web address]   [facility] Maternal Fetal Medicine Ultrasound [facility]  ([facility])    [address]  [facility] [address]   [phone]           Wear comfortable clothes and leave your valuables at home.            Aug 24, 2018  2:45 PM CDT   Radiology MD with RH BLANCA PALUMBO   [facility] Maternal Fetal Medicine - [facility] ([facility])    [address]  [facility] [address]   [phone]           Please arrive at the time given for your first appointment. This visit is used internally to schedule the physician's time during your ultrasound.            Aug 28, 2018  8:00 AM CDT   MFM US COMPRE TWINS F/U with RHMFMUSR3   [facility] Maternal Fetal Medicine Ultrasound - [facility] ([facility])    [address]  [facility] [address]   [phone]           Wear comfortable clothes and leave your valuables at home.            Aug 28, 2018  8:30 AM CDT   Radiology MD with  BLANCA PALUMBO   [facility] Maternal Fetal Medicine [facility] ([facility])    [address]  [facility] [address]   [phone]           Please arrive at the time given for your first appointment. This visit is used internally to schedule the physician's time during your ultrasound.            Aug 31, 2018  3:00 PM CDT   MFM US COMPRE TWINS F/U with [id number]   [facility] Maternal Fetal Medicine Ultrasound - [facility] ([facility])    [address]  [facility] [address]   [phone]           Wear comfortable clothes and leave your valuables at home.            Aug 31, 2018  3:30 PM CDT   Radiology MD with  BLANCA PALUMBO   [facility] Maternal Fetal Medicine [facility] ([facility])    [address]  [facility] [address]   [phone]           Please arrive at the time given for your first appointment. This visit is used internally to schedule the physician's time during your ultrasound.              Who to contact     If you have questions or need  follow up information about today's clinic visit or your schedule please contact [facility] directly at [phone].  Normal or non-critical lab and imaging results will be communicated to you by MyChart, letter or phone within 4 business days after the clinic has received the results. If you do not hear from us within 7 days, please contact the clinic through Samplesainthart or phone. If you have a critical or abnormal lab result, we will notify you by phone as soon as possible.  Submit refill requests through SecureAlert or call your pharmacy and they will forward the refill request to us. Please allow 3 business days for your refill to be completed.          Additional Information About Your Visit        SamplesaintharNano Defense Solutions Information     SecureAlert gives you secure access to your electronic health record. If you see a primary care provider, you can also send messages to your care team and make appointments. If you have questions, please call your primary care clinic.  If you do not have a primary care provider, please call [phone] and they will assist you.        Care EveryWhere ID     This is your Care EveryWhere ID. This could be used by other organizations to access your [place] medical records  [id number]        Your Vitals Were     Last Period                   01/31/2018            Blood Pressure from Last 3 Encounters:   07/03/18 115/70    Weight from Last 3 Encounters:   No data found for Wt              Today, you had the following     No orders found for display       Primary Care Provider    None Specified       No primary provider on file.        Equal Access to Services     SONIA VICENTE : Hadii sonia Erickson, juany jorge, evan espinal . So [facility] [phone].    ATENCIÓN: Si habla español, tiene a luciano disposición servicios gratuitos de asistencia lingüística. Llame al [phone].    We comply with applicable federal  civil rights laws and [place] laws. We do not discriminate on the basis of race, color, national origin, age, disability, sex, sexual orientation, or gender identity.            Thank you!     Thank you for choosing [facility]  for your care. Our goal is always to provide you with excellent care. Hearing back from our patients is one way we can continue to improve our services. Please take a few minutes to complete the written survey that you may receive in the mail after your visit with us. Thank you!             Your Updated Medication List - Protect others around you: Learn how to safely use, store and throw away your medicines at www.disposemymeds.org.      Notice  As of 8/14/2018 10:43 AM    You have not been prescribed any medications.

## 2018-08-17 ENCOUNTER — HOSPITAL ENCOUNTER (OUTPATIENT)
Dept: ULTRASOUND IMAGING | Facility: CLINIC | Age: 38
Discharge: HOME | End: 2018-08-17
Attending: OBSTETRICS & GYNECOLOGY | Admitting: OBSTETRICS & GYNECOLOGY
Payer: COMMERCIAL

## 2018-08-17 ENCOUNTER — OFFICE VISIT (OUTPATIENT)
Dept: MATERNAL FETAL MEDICINE | Facility: CLINIC | Age: 38
End: 2018-08-17
Attending: OBSTETRICS & GYNECOLOGY
Payer: COMMERCIAL

## 2018-08-17 DIAGNOSIS — O30.032 MONOCHORIONIC DIAMNIOTIC TWIN GESTATION IN SECOND TRIMESTER: ICD-10-CM

## 2018-08-17 DIAGNOSIS — O30.033 MONOCHORIONIC DIAMNIOTIC TWIN GESTATION IN THIRD TRIMESTER: Primary | ICD-10-CM

## 2018-08-17 PROCEDURE — 76820 UMBILICAL ARTERY ECHO: CPT | Mod: 59 | Performed by: OBSTETRICS & GYNECOLOGY

## 2018-08-17 PROCEDURE — 76816 OB US FOLLOW-UP PER FETUS: CPT

## 2018-08-17 NOTE — MR AVS SNAPSHOT
After Visit Summary   8/17/2018    Soni Lo    MRN: [id number]           Patient Information     Date Of Birth          1980        Visit Information        Provider Department      8/17/2018 3:30 PM Mannie Yeboah MD [facility] Maternal Fetal Medicine [facility]        Today's Diagnoses     Monochorionic diamniotic twin gestation in third trimester    -  1       Follow-ups after your visit        Your next 10 appointments already scheduled     Aug 17, 2018  3:30 PM CDT   Radiology MD with Mannie Yeboah MD   [facility] Maternal Fetal Medicine [facility] ([facility])    [address]  [facility] [address] [phone]           Please arrive at the time given for your first appointment. This visit is used internally to schedule the physician's time during your ultrasound.            Aug 21, 2018  8:00 AM CDT   MFM US COMPRE TWINS F/U with [id number]   [facility] Maternal Fetal Medicine Ultrasound - [facility] ([facility])    [address]  [facility] [address] [phone]           Wear comfortable clothes and leave your valuables at home.            Aug 21, 2018  8:30 AM CDT   Radiology MD with  BLANCA PALUMBO   [facility] Maternal Fetal Medicine [facility] ([facility])    [address]  [facility] [address] [phone]           Please arrive at the time given for your first appointment. This visit is used internally to schedule the physician's time during your ultrasound.            Aug 24, 2018  2:15 PM CDT   MFM US COMPRE TWINS F/U with [id number]   [facility] Maternal Fetal Medicine Ultrasound - [facility] ([facility])    [address]  [facility] [address] [phone]           Wear comfortable clothes and leave your valuables at home.            Aug 24, 2018  2:45 PM CDT   Radiology MD with JOSEF RIOS MD   [facility] Maternal Fetal Medicine [facility] ([facility])    [address]  [facility] [address]   [phone]           Please arrive at the time given for your first appointment. This visit is used internally to schedule the physician's time during your ultrasound.            Aug 28, 2018  8:00 AM CDT   MFM US COMPRE TWINS F/U with RHMFMUSR3   [facility]etal [facility] Ultrasound - [facility] ([facility])    [address]  [facility] [address] [phone]           Wear comfortable clothes and leave your valuables at home.            Aug 28, 2018  8:30 AM CDT   Radiology MD with RH BLANCA PALUMBO   [facility] Maternal Fetal Medicine [facility] ([facility])    [address]  [facility] [address]   [phone]           Please arrive at the time given for your first appointment. This visit is used internally to schedule the physician's time during your ultrasound.            Aug 31, 2018  3:00 PM CDT   MFM US COMPRE TWINS F/U with [id number]   [facility] Maternal Fetal Medicine Ultrasound - [facility] ([facility])    [address]  [facility] [address] [phone]           Wear comfortable clothes and leave your valuables at home.            Aug 31, 2018  3:30 PM CDT   Radiology MD with  MFM MD   [facility] Maternal Fetal Medicine [facility] ([facility])    [address]  [facility] [address] [phone]           Please arrive at the time given for your first appointment. This visit is used internally to schedule the physician's time during your ultrasound.            Sep 04, 2018  8:00 AM CDT   MFM US COMPRE TWINS F/U with RHMFMUSR1   [facility] Maternal Fetal Medicine Ultrasound - [facility] ([facility])    [address]  [facility] [address] [phone]           Wear comfortable clothes and leave your valuables at home.              Who to contact     If you have questions  or need follow up information about today's clinic visit or your schedule please contact [facility] directly at [phone].  Normal or non-critical lab and imaging results will be communicated to you by MyChart, letter or phone within 4 business days after the clinic has received the results. If you do not hear from us within 7 days, please contact the clinic through Greytip Softwarehart or phone. If you have a critical or abnormal lab result, we will notify you by phone as soon as possible.  Submit refill requests through Service Route or call your pharmacy and they will forward the refill request to us. Please allow 3 business days for your refill to be completed.          Additional Information About Your Visit        Greytip SoftwareharCodar[employer] Information     Service Route gives you secure access to your electronic health record. If you see a primary care provider, you can also send messages to your care team and make appointments. If you have questions, please call your primary care clinic.  If you do not have a primary care provider, please call [phone] and they will assist you.        Care EveryWhere ID     This is your Care EveryWhere ID. This could be used by other organizations to access your [place] medical records  [id number]        Your Vitals Were     Last Period                   01/31/2018            Blood Pressure from Last 3 Encounters:   07/03/18 115/70    Weight from Last 3 Encounters:   No data found for Wt              Today, you had the following     No orders found for display       Primary Care Provider    None Specified       No primary provider on file.        Equal Access to Services     ALL[facility]ON [facility] : Hadii sonia Erickson, juany jorge, qaybta mistyalevan luo . So [facility] [phone].    ATENCIÓN: Si habla español, tiene a luciano disposición servicios gratuitos de asistencia lingüística. Llame al [phone].    We comply with applicable  federal civil rights laws and [place] laws. We do not discriminate on the basis of race, color, national origin, age, disability, sex, sexual orientation, or gender identity.            Thank you!     Thank you for choosing [facility]  for your care. Our goal is always to provide you with excellent care. Hearing back from our patients is one way we can continue to improve our services. Please take a few minutes to complete the written survey that you may receive in the mail after your visit with us. Thank you!             Your Updated Medication List - Protect others around you: Learn how to safely use, store and throw away your medicines at www.disposemymeds.org.      Notice  As of 8/17/2018  3:25 PM    You have not been prescribed any medications.

## 2018-08-17 NOTE — PROGRESS NOTES
"Please see \"Imaging\" tab under \"Chart Review\" for details of today's US at the [facility].    Mannie Yeboah MD  Maternal-Fetal Medicine    "

## 2018-08-21 ENCOUNTER — OFFICE VISIT (OUTPATIENT)
Dept: MATERNAL FETAL MEDICINE | Facility: CLINIC | Age: 38
End: 2018-08-21
Attending: OBSTETRICS & GYNECOLOGY
Payer: COMMERCIAL

## 2018-08-21 ENCOUNTER — HOSPITAL ENCOUNTER (OUTPATIENT)
Dept: ULTRASOUND IMAGING | Facility: CLINIC | Age: 38
Discharge: HOME | End: 2018-08-21
Attending: OBSTETRICS & GYNECOLOGY | Admitting: OBSTETRICS & GYNECOLOGY
Payer: COMMERCIAL

## 2018-08-21 DIAGNOSIS — O30.033 MONOCHORIONIC DIAMNIOTIC TWIN GESTATION IN THIRD TRIMESTER: Primary | ICD-10-CM

## 2018-08-21 DIAGNOSIS — O30.032 MONOCHORIONIC DIAMNIOTIC TWIN GESTATION IN SECOND TRIMESTER: ICD-10-CM

## 2018-08-21 PROCEDURE — 76820 UMBILICAL ARTERY ECHO: CPT | Performed by: OBSTETRICS & GYNECOLOGY

## 2018-08-21 PROCEDURE — 76816 OB US FOLLOW-UP PER FETUS: CPT

## 2018-08-21 NOTE — MR AVS SNAPSHOT
After Visit Summary   8/21/2018    Soni Lo    MRN: [id number]           Patient Information     Date Of Birth          1980        Visit Information        Provider Department      8/21/2018 8:30 AM Tiffany De La Fuente MD [facility] Maternal Fetal Medicine [facility]        Today's Diagnoses     Monochorionic diamniotic twin gestation in third trimester    -  1       Follow-ups after your visit        Your next 10 appointments already scheduled     Aug 24, 2018  2:15 PM CDT   MFM US COMPRE TWINS F/U with [id number]   [facility] Maternal Fetal Medicine Ultrasound - [facility] ([facility])    [address]  [facility] [address] [phone]           Wear comfortable clothes and leave your valuables at home.            Aug 24, 2018  2:45 PM CDT   Radiology MD with  BLANCA PALUMBO   [facility] Maternal Fetal Medicine [facility] ([facility])    [address]  [facility] [address] [phone]           Please arrive at the time given for your first appointment. This visit is used internally to schedule the physician's time during your ultrasound.            Aug 28, 2018  8:00 AM CDT   MFM US COMPRE TWINS F/U with RHMFMUSR3   [facility] Maternal Fetal Medicine Ultrasound - [facility] ([facility])    [address]  [facility] [address] [phone]           Wear comfortable clothes and leave your valuables at home.            Aug 28, 2018  8:30 AM CDT   Radiology MD with  BLANCA PALUMBO   [facility] Maternal Fetal Medicine [facility] ([facility])    [address]  [facility] [address] [phone]           Please arrive at the time given for your first appointment. This visit is used internally to schedule the physician's time during your ultrasound.            Aug 31, 2018  3:00 PM CDT   MFM US COMPRE TWINS F/U with RHMFMUSR2   [facility] Maternal Fetal Medicine Ultrasound [facility]  ([facility])    [address]  [facility] [address]   [phone]           Wear comfortable clothes and leave your valuables at home.            Aug 31, 2018  3:30 PM CDT   Radiology MD with RH BLANCA PALUMBO   [facility] Maternal Fetal Medicine - [facility] ([facility])    [address]  [facility] [address]   [phone]           Please arrive at the time given for your first appointment. This visit is used internally to schedule the physician's time during your ultrasound.            Sep 04, 2018  8:00 AM CDT   MFM US COMPRE TWINS F/U with RHMFMUSR1   [facility] Maternal Fetal Medicine Ultrasound - [facility] ([facility])    [address]  [facility] [address]   [phone]           Wear comfortable clothes and leave your valuables at home.            Sep 04, 2018  8:30 AM CDT   Radiology MD with JOSEF RIOS MD   [facility] Maternal Fetal Medicine [facility] ([facility])    [address]  [facility] [address]   [phone]           Please arrive at the time given for your first appointment. This visit is used internally to schedule the physician's time during your ultrasound.            Sep 07, 2018  3:00 PM CDT   MFM US COMPRE TWINS F/U with RHMFMUSR1   [facility] Maternal Fetal Medicine Ultrasound - [facility] ([facility])    [address]  [facility] [address]   [phone]           Wear comfortable clothes and leave your valuables at home.            Sep 07, 2018  3:30 PM CDT   Radiology MD with  BLANCA PALUMBO   [facility] Maternal Fetal Medicine [facility] ([facility])    [address]  [facility] [address]   [phone]           Please arrive at the time given for your first appointment. This visit is used internally to schedule the physician's time during your ultrasound.              Who to contact     If you have questions or need  follow up information about today's clinic visit or your schedule please contact [facility] directly at [phone].  Normal or non-critical lab and imaging results will be communicated to you by MyChart, letter or phone within 4 business days after the clinic has received the results. If you do not hear from us within 7 days, please contact the clinic through [employer] or phone. If you have a critical or abnormal lab result, we will notify you by phone as soon as possible.  Submit refill requests through [employer] or call your pharmacy and they will forward the refill request to us. Please allow 3 business days for your refill to be completed.          Additional Information About Your Visit        [employer] Information     [employer] gives you secure access to your electronic health record. If you see a primary care provider, you can also send messages to your care team and make appointments. If you have questions, please call your primary care clinic.  If you do not have a primary care provider, please call [phone] and they will assist you.        Care EveryWhere ID     This is your Care EveryWhere ID. This could be used by other organizations to access your [place] medical records  [id number]        Your Vitals Were     Last Period                   01/31/2018            Blood Pressure from Last 3 Encounters:   07/03/18 115/70    Weight from Last 3 Encounters:   No data found for Wt              Today, you had the following     No orders found for display       Primary Care Provider    None Specified       No primary provider on file.        Equal Access to Services     SONIA VICENTE : Hadii sonia Erickson, juayn jorge, evan espinal . So [facility] [phone].    ATENCIÓN: Si habla español, tiene a luciano disposición servicios gratuitos de asistencia lingüística. Llame al [phone].    We comply with applicable federal  civil rights laws and [place] laws. We do not discriminate on the basis of race, color, national origin, age, disability, sex, sexual orientation, or gender identity.            Thank you!     Thank you for choosing [facility]  for your care. Our goal is always to provide you with excellent care. Hearing back from our patients is one way we can continue to improve our services. Please take a few minutes to complete the written survey that you may receive in the mail after your visit with us. Thank you!             Your Updated Medication List - Protect others around you: Learn how to safely use, store and throw away your medicines at www.disposemymeds.org.      Notice  As of 8/21/2018  8:50 AM    You have not been prescribed any medications.

## 2018-08-21 NOTE — PROGRESS NOTES
Please see ultrasound report under imaging tab for details on ultrasound performed today.    Tiffany De La Fuente MD  , OB/GYN  Maternal-Fetal Medicine  [email or web address]  [phone] (Academic office)  [phone] (Pager)

## 2018-08-24 ENCOUNTER — HOSPITAL ENCOUNTER (OUTPATIENT)
Dept: ULTRASOUND IMAGING | Facility: CLINIC | Age: 38
Discharge: HOME | End: 2018-08-24
Attending: OBSTETRICS & GYNECOLOGY | Admitting: OBSTETRICS & GYNECOLOGY
Payer: COMMERCIAL

## 2018-08-24 ENCOUNTER — OFFICE VISIT (OUTPATIENT)
Dept: MATERNAL FETAL MEDICINE | Facility: CLINIC | Age: 38
End: 2018-08-24
Attending: OBSTETRICS & GYNECOLOGY
Payer: COMMERCIAL

## 2018-08-24 DIAGNOSIS — O36.5992 DISCORDANT FETAL GROWTH IN TWIN GESTATION, FETUS 2 OF MULTIPLE GESTATION: ICD-10-CM

## 2018-08-24 DIAGNOSIS — O30.032 MONOCHORIONIC DIAMNIOTIC TWIN GESTATION IN SECOND TRIMESTER: ICD-10-CM

## 2018-08-24 DIAGNOSIS — O36.5932 INTRAUTERINE GROWTH RESTRICTION AFFECTING ANTEPARTUM CARE OF MOTHER IN THIRD TRIMESTER, FETUS 2: ICD-10-CM

## 2018-08-24 DIAGNOSIS — O30.009 DISCORDANT FETAL GROWTH IN TWIN GESTATION, FETUS 2 OF MULTIPLE GESTATION: ICD-10-CM

## 2018-08-24 DIAGNOSIS — O30.033 MONOCHORIONIC DIAMNIOTIC TWIN GESTATION IN THIRD TRIMESTER: Primary | ICD-10-CM

## 2018-08-24 PROCEDURE — 76816 OB US FOLLOW-UP PER FETUS: CPT

## 2018-08-24 PROCEDURE — 76819 FETAL BIOPHYS PROFIL W/O NST: CPT | Mod: 59 | Performed by: OBSTETRICS & GYNECOLOGY

## 2018-08-24 NOTE — MR AVS SNAPSHOT
After Visit Summary   8/24/2018    Soni Lo    MRN: [id number]           Patient Information     Date Of Birth          1980        Visit Information        Provider Department      8/24/2018 2:45 PM Tiffany De La Fuente MD [facility] Maternal Fetal Medicine - [facility]        Today's Diagnoses     Monochorionic diamniotic twin gestation in third trimester    -  1    Discordant fetal growth in twin gestation, fetus 2 of multiple gestation        Intrauterine growth restriction affecting antepartum care of mother in third trimester, fetus 2           Follow-ups after your visit        Your next 10 appointments already scheduled     Aug 28, 2018  8:00 AM CDT   MFM US COMPRE TWINS F/U with RHMFMUSR3   [facility] Maternal Fetal Medicine Ultrasound - [facility] ([facility])    [address]  [facility] [address] [phone]           Wear comfortable clothes and leave your valuables at home.            Aug 28, 2018  8:30 AM CDT   Radiology MD with  BLANCA PALUMBO   [facility] Maternal Fetal Medicine [facility] ([facility])    [address]  [facility] [address] [phone]           Please arrive at the time given for your first appointment. This visit is used internally to schedule the physician's time during your ultrasound.            Aug 31, 2018  3:00 PM CDT   MFM US COMPRE TWINS F/U with RHMFMUSR2   [facility] Maternal Fetal Medicine Ultrasound - [facility] ([facility])    [address]  [facility] [address] [phone]           Wear comfortable clothes and leave your valuables at home.            Aug 31, 2018  3:30 PM CDT   Radiology MD with  MFKENIA PALUMBO   [facility] Maternal Fetal Medicine [facility] ([facility])    [address]  [facility] [address] [phone]           Please arrive at the time given for your first appointment. This visit is used internally to  schedule the physician's time during your ultrasound.            Sep 04, 2018  8:00 AM CDT   MFM US COMPRE TWINS F/U with RHMFMUSR1   [facility] Maternal Fetal Medicine Ultrasound - [facility] ([facility])    [address]  [facility] [address] [phone]           Wear comfortable clothes and leave your valuables at home.            Sep 04, 2018  8:30 AM CDT   Radiology MD with JOSEF RIOS MD   [facility] Maternal Fetal Medicine [facility] ([facility])    [address]  [facility] [address]   [phone]           Please arrive at the time given for your first appointment. This visit is used internally to schedule the physician's time during your ultrasound.            Sep 07, 2018  3:00 PM CDT   MFM US COMPRE TWINS F/U with RHMFMUSR1   [facility] Maternal Fetal Medicine Ultrasound - [facility] ([facility])    [address]  [facility] [id number] [phone]           Wear comfortable clothes and leave your valuables at home.            Sep 07, 2018  3:30 PM CDT   Radiology MD with JOSEF RIOS MD   [facility] Maternal Fetal Medicine [facility] ([facility])    [address]  [facility] [address]   [phone]           Please arrive at the time given for your first appointment. This visit is used internally to schedule the physician's time during your ultrasound.            Sep 11, 2018  8:00 AM CDT   MFM US COMPRE TWINS F/U with RHMFMUSR2   [facility] Maternal Fetal Medicine Ultrasound - [facility] ([facility])    [address]  [facility] [address]   [phone]           Wear comfortable clothes and leave your valuables at home.            Sep 11, 2018  8:30 AM CDT   Radiology MD with RH BLANCA PALUMBO   [facility] Fetal Medicine [facility] ([facility])    [address]  [facility] [address]   [phone]           Please arrive at the time  given for your first appointment. This visit is used internally to schedule the physician's time during your ultrasound.              Who to contact     If you have questions or need follow up information about today's clinic visit or your schedule please contact [facility] MATERNAL FETAL MEDICINE [facility] directly at [phone].  Normal or non-critical lab and imaging results will be communicated to you by MyChart, letter or phone within 4 business days after the clinic has received the results. If you do not hear from us within 7 days, please contact the clinic through [employer] or phone. If you have a critical or abnormal lab result, we will notify you by phone as soon as possible.  Submit refill requests through [employer] or call your pharmacy and they will forward the refill request to us. Please allow 3 business days for your refill to be completed.          Additional Information About Your Visit        MyChart Information     [employer] gives you secure access to your electronic health record. If you see a primary care provider, you can also send messages to your care team and make appointments. If you have questions, please call your primary care clinic.  If you do not have a primary care provider, please call [phone] and they will assist you.        Care EveryWhere ID     This is your Care EveryWhere ID. This could be used by other organizations to access your [place] medical records  [id number]        Your Vitals Were     Last Period                   01/31/2018            Blood Pressure from Last 3 Encounters:   07/03/18 115/70    Weight from Last 3 Encounters:   No data found for Wt              Today, you had the following     No orders found for display       Primary Care Provider    None Specified       No primary provider on file.        Equal Access to Services     SONIA VICENTE : Joao Erickson, juany jorge, qaevan olivera .  So [facility] [phone].    ATENCIÓN: Si habla eliañol, tiene a luciano disposición servicios gratuitos de asistencia lingüística. Lljacqueline al [phone].    We comply with applicable federal civil rights laws and [place] laws. We do not discriminate on the basis of race, color, national origin, age, disability, sex, sexual orientation, or gender identity.            Thank you!     Thank you for choosing [facility]  for your care. Our goal is always to provide you with excellent care. Hearing back from our patients is one way we can continue to improve our services. Please take a few minutes to complete the written survey that you may receive in the mail after your visit with us. Thank you!             Your Updated Medication List - Protect others around you: Learn how to safely use, store and throw away your medicines at www.disposemymeds.org.      Notice  As of 8/24/2018  4:24 PM    You have not been prescribed any medications.

## 2018-08-24 NOTE — PROGRESS NOTES
Please see ultrasound report under imaging tab for details on ultrasound performed today.    Tiffany De La Fuente MD  , OB/GYN  Maternal-Fetal Medicine  [email or web address]  [phone] (Academic office)  [phone] (Pager)

## 2018-08-28 ENCOUNTER — HOSPITAL ENCOUNTER (OUTPATIENT)
Dept: ULTRASOUND IMAGING | Facility: CLINIC | Age: 38
Discharge: HOME | End: 2018-08-28
Attending: OBSTETRICS & GYNECOLOGY | Admitting: OBSTETRICS & GYNECOLOGY
Payer: COMMERCIAL

## 2018-08-28 ENCOUNTER — OFFICE VISIT (OUTPATIENT)
Dept: MATERNAL FETAL MEDICINE | Facility: CLINIC | Age: 38
End: 2018-08-28
Attending: OBSTETRICS & GYNECOLOGY
Payer: COMMERCIAL

## 2018-08-28 DIAGNOSIS — O30.032 MONOCHORIONIC DIAMNIOTIC TWIN GESTATION IN SECOND TRIMESTER: ICD-10-CM

## 2018-08-28 DIAGNOSIS — O30.033 MONOCHORIONIC DIAMNIOTIC TWIN PREGNANCY, THIRD TRIMESTER: Primary | ICD-10-CM

## 2018-08-28 PROCEDURE — 76816 OB US FOLLOW-UP PER FETUS: CPT

## 2018-08-28 PROCEDURE — 76820 UMBILICAL ARTERY ECHO: CPT | Mod: 59 | Performed by: OBSTETRICS & GYNECOLOGY

## 2018-08-28 NOTE — MR AVS SNAPSHOT
After Visit Summary   8/28/2018    Soni Lo    MRN: [id number]           Patient Information     Date Of Birth          1980        Visit Information        Provider Department      8/28/2018 8:30 AM La Nena Alonso MD [facility] Maternal Fetal Medicine [facility]        Today's Diagnoses     Monochorionic diamniotic twin pregnancy, third trimester    -  1       Follow-ups after your visit        Your next 10 appointments already scheduled     Aug 31, 2018  3:00 PM CDT   MFM US COMPRE TWINS F/U with RHMFMUSR2   [facility] Maternal Fetal Medicine Ultrasound - [facility] ([facility])    [address]  [facility] [address] [phone]           Wear comfortable clothes and leave your valuables at home.            Aug 31, 2018  3:30 PM CDT   Radiology MD with [facility] Hospital[facility] PALUMBO   [facility] Maternal Fetal Medicine [facility] ([facility])    [address]  [facility] [address] [phone]           Please arrive at the time given for your first appointment. This visit is used internally to schedule the physician's time during your ultrasound.            Sep 04, 2018  8:00 AM CDT   MFM US COMPRE TWINS F/U with RHMFMUSR1   [facility] Maternal Fetal Medicine Ultrasound - [facility] ([facility])    [address]  [facility] [address] [phone]           Wear comfortable clothes and leave your valuables at home.            Sep 04, 2018  8:30 AM CDT   Radiology MD with [facility] HospitalKENIA PALUMBO   [facility] Maternal Fetal Medicine [facility] ([facility])    [address]  [facility] [address] [phone]           Please arrive at the time given for your first appointment. This visit is used internally to schedule the physician's time during your ultrasound.            Sep 07, 2018  3:00 PM CDT   MFM US COMPRE TWINS F/U with RHMFMUSR1   [facility] Maternal Fetal Medicine Ultrasound [facility]  ([facility])    [address]  [facility] [address]   [phone]           Wear comfortable clothes and leave your valuables at home.            Sep 07, 2018  3:30 PM CDT   Radiology MD with RH BLANCA PALUMBO   [facility] Maternal Fetal Medicine - [facility] ([facility])    [address]  [facility] [address]   [phone]           Please arrive at the time given for your first appointment. This visit is used internally to schedule the physician's time during your ultrasound.            Sep 11, 2018  8:00 AM CDT   MFM US COMPRE TWINS F/U with RHMFMUSR2   [facility] M[facility]etal Medicine Ultrasound - [facility] ([facility])    [address]  [facility] [address]   [phone]           Wear comfortable clothes and leave your valuables at home.            Sep 11, 2018  8:30 AM CDT   Radiology MD with  BLANCA PALUMBO   [facility] Maternal Fetal Medicine [facility] ([facility])    [address]  [facility] [address]   [phone]           Please arrive at the time given for your first appointment. This visit is used internally to schedule the physician's time during your ultrasound.            Sep 14, 2018  8:00 AM CDT   MFM US COMPRE TWINS F/U with RHMFMUSR2   [facility] Maternal Fetal Medicine Ultrasound - [facility] ([facility])    [address]  [facility] [address]   [phone]           Wear comfortable clothes and leave your valuables at home.            Sep 14, 2018  8:30 AM CDT   Radiology MD with  BLANCA PALUMBO   [facility] Maternal Fetal Medicine [facility] ([facility])    [address]  [facility] [address]   [phone]           Please arrive at the time given for your first appointment. This visit is used internally to schedule the physician's time during your ultrasound.              Who to contact     If you have questions or need  follow up information about today's clinic visit or your schedule please contact [facility] directly at [phone].  Normal or non-critical lab and imaging results will be communicated to you by MyChart, letter or phone within 4 business days after the clinic has received the results. If you do not hear from us within 7 days, please contact the clinic through [employer] or phone. If you have a critical or abnormal lab result, we will notify you by phone as soon as possible.  Submit refill requests through [employer] or call your pharmacy and they will forward the refill request to us. Please allow 3 business days for your refill to be completed.          Additional Information About Your Visit        [employer] Information     [employer] gives you secure access to your electronic health record. If you see a primary care provider, you can also send messages to your care team and make appointments. If you have questions, please call your primary care clinic.  If you do not have a primary care provider, please call [phone] and they will assist you.        Care EveryWhere ID     This is your Care EveryWhere ID. This could be used by other organizations to access your [place] medical records  [id number]        Your Vitals Were     Last Period                   01/31/2018            Blood Pressure from Last 3 Encounters:   07/03/18 115/70    Weight from Last 3 Encounters:   No data found for Wt              Today, you had the following     No orders found for display       Primary Care Provider    None Specified       No primary provider on file.        Equal Access to Services     SONIA VICENTE : Hadii sonia Erickson, juany jorge, evan espinal . So [facility] [phone].    ATENCIÓN: Si habla español, tiene a luciano disposición servicios gratuitos de asistencia lingüística. Llame al [phone].    We comply with applicable federal  civil rights laws and [place] laws. We do not discriminate on the basis of race, color, national origin, age, disability, sex, sexual orientation, or gender identity.            Thank you!     Thank you for choosing [facility]  for your care. Our goal is always to provide you with excellent care. Hearing back from our patients is one way we can continue to improve our services. Please take a few minutes to complete the written survey that you may receive in the mail after your visit with us. Thank you!             Your Updated Medication List - Protect others around you: Learn how to safely use, store and throw away your medicines at www.disposemymeds.org.      Notice  As of 8/28/2018 10:17 AM    You have not been prescribed any medications.

## 2018-08-28 NOTE — PROGRESS NOTES
Please see the imaging tab for details of the ultrasound performed today.    La Nena Alonso MD  Specialist in Maternal-Fetal Medicine

## 2018-08-31 ENCOUNTER — OFFICE VISIT (OUTPATIENT)
Dept: MATERNAL FETAL MEDICINE | Facility: CLINIC | Age: 38
End: 2018-08-31
Attending: OBSTETRICS & GYNECOLOGY
Payer: COMMERCIAL

## 2018-08-31 ENCOUNTER — HOSPITAL ENCOUNTER (OUTPATIENT)
Dept: ULTRASOUND IMAGING | Facility: CLINIC | Age: 38
Discharge: HOME | End: 2018-08-31
Attending: OBSTETRICS & GYNECOLOGY | Admitting: OBSTETRICS & GYNECOLOGY
Payer: COMMERCIAL

## 2018-08-31 DIAGNOSIS — O30.032 MONOCHORIONIC DIAMNIOTIC TWIN GESTATION IN SECOND TRIMESTER: ICD-10-CM

## 2018-08-31 DIAGNOSIS — O30.033 MONOCHORIONIC DIAMNIOTIC TWIN PREGNANCY, THIRD TRIMESTER: Primary | ICD-10-CM

## 2018-08-31 PROCEDURE — 76816 OB US FOLLOW-UP PER FETUS: CPT | Mod: 59

## 2018-08-31 PROCEDURE — 76820 UMBILICAL ARTERY ECHO: CPT | Mod: 59 | Performed by: OBSTETRICS & GYNECOLOGY

## 2018-08-31 NOTE — MR AVS SNAPSHOT
After Visit Summary   8/31/2018    Soni Lo    MRN: [id number]           Patient Information     Date Of Birth          1980        Visit Information        Provider Department      8/31/2018 3:30 PM Ginna Palmer MD [facility] Maternal Fetal Medicine [facility]        Today's Diagnoses     Monochorionic diamniotic twin pregnancy, third trimester    -  1       Follow-ups after your visit        Your next 10 appointments already scheduled     Sep 04, 2018  8:00 AM CDT   MFM US COMPRE TWINS F/U with [id number]   [facility] Maternal Fetal Medicine Ultrasound - [facility] ([facility])    [address]  [facility] [place] [address] [phone]           Wear comfortable clothes and leave your valuables at home.            Sep 04, 2018  8:30 AM CDT   Radiology MD with  BLANCA PALUMBO   [facility] Fetal [facility] ([facility])    [address] [facility] [address]  [facility] [place] [address] [phone]           Please arrive at the time given for your first appointment. This visit is used internally to schedule the physician's time during your ultrasound.            Sep 07, 2018  3:00 PM CDT   MFM US COMPRE TWINS F/U with RHMFMUSR1   [facility] Maternal Fetal Medicine Ultrasound - [facility] ([facility])    [address]  [facility] [place] [id number] [phone]           Wear comfortable clothes and leave your valuables at home.            Sep 07, 2018  3:30 PM CDT   Radiology MD with  BLANCA PALUMBO   [facility] Fetal Medicine [facility] ([facility])    [address]  [facility] [place] [address] [phone]           Please arrive at the time given for your first appointment. This visit is used internally to schedule the physician's time during your ultrasound.            Sep 11, 2018  8:00 AM CDT   MFM US COMPRE TWINS F/U with [id number]   [facility] Maternal Fetal Medicine Ultrasound - [facility]  [facility])    [address]  [facility] [address]   [phone]           Wear comfortable clothes and leave your valuables at home.            Sep 11, 2018  8:30 AM CDT   Radiology MD with RH BLANCA PALUMBO   [facility] Maternal Fetal Medicine - [facility] ([facility] [place])    [address]  [facility] [address]   [phone]           Please arrive at the time given for your first appointment. This visit is used internally to schedule the physician's time during your ultrasound.            Sep 14, 2018  8:00 AM CDT   MFM US COMPRE TWINS F/U with RHMFMUSR2   [facility] Maternal Fetal Medicine Ultrasound - [facility] ([facility] [place])    [address]  [facility] [address]   [phone]           Wear comfortable clothes and leave your valuables at home.            Sep 14, 2018  8:30 AM CDT   Radiology MD with JOSEF RIOS MD   [facility] Maternal Fetal Medicine [facility])    [address]  [facility] [address]   [phone]           Please arrive at the time given for your first appointment. This visit is used internally to schedule the physician's time during your ultrasound.            Sep 18, 2018  8:00 AM CDT   MFM US COMPRE TWINS F/U with RHMFMUSR2   [facility] Maternal Fetal Medicine Ultrasound - [facility] ([facility] [place])    [address]  [facility] [address]   [phone]           Wear comfortable clothes and leave your valuables at home.            Sep 18, 2018  8:30 AM CDT   Radiology MD with RH BLANCA PALUMBO   [facility] Maternal Fetal Medicine [facility] ([facility] [place])    [address]  [facility] [address]   [phone]           Please arrive at the time given for your first appointment. This visit is used internally to schedule the physician's time during your ultrasound.              Who to contact     If you have questions or need follow up  information about today's clinic visit or your schedule please contact [facility] directly at [phone].  Normal or non-critical lab and imaging results will be communicated to you by [employer], letter or phone within 4 business days after the clinic has received the results. If you do not hear from us within 7 days, please contact the clinic through [employer] or phone. If you have a critical or abnormal lab result, we will notify you by phone as soon as possible.  Submit refill requests through Quosis or call your pharmacy and they will forward the refill request to us. Please allow 3 business days for your refill to be completed.          Additional Information About Your Visit        [employer] Information     Quosis gives you secure access to your electronic health record. If you see a primary care provider, you can also send messages to your care team and make appointments. If you have questions, please call your primary care clinic.  If you do not have a primary care provider, please call [phone] and they will assist you.        Care EveryWhere ID     This is your Care EveryWhere ID. This could be used by other organizations to access your [place] medical records  [id number]        Your Vitals Were     Last Period                   01/31/2018            Blood Pressure from Last 3 Encounters:   07/03/18 115/70    Weight from Last 3 Encounters:   No data found for Wt              Today, you had the following     No orders found for display       Primary Care Provider    None Specified       No primary provider on file.        Equal Access to Services     SONIA VICENTE : Joao Erickson, juany jorge, qaybevan eubanks . So [facility] [phone].    ATENCIÓN: Si habla español, tiene a luciano disposición servicios gratuitos de asistencia lingüística. Stephen al [phone].    We comply with applicable federal civil rights  laws and [place] laws. We do not discriminate on the basis of race, color, national origin, age, disability, sex, sexual orientation, or gender identity.            Thank you!     Thank you for choosing [facility]  for your care. Our goal is always to provide you with excellent care. Hearing back from our patients is one way we can continue to improve our services. Please take a few minutes to complete the written survey that you may receive in the mail after your visit with us. Thank you!             Your Updated Medication List - Protect others around you: Learn how to safely use, store and throw away your medicines at www.disposemymeds.org.      Notice  As of 8/31/2018  4:53 PM    You have not been prescribed any medications.

## 2018-09-04 ENCOUNTER — OFFICE VISIT (OUTPATIENT)
Dept: MATERNAL FETAL MEDICINE | Facility: CLINIC | Age: 38
End: 2018-09-04
Attending: OBSTETRICS & GYNECOLOGY
Payer: COMMERCIAL

## 2018-09-04 ENCOUNTER — HOSPITAL ENCOUNTER (OUTPATIENT)
Dept: ULTRASOUND IMAGING | Facility: CLINIC | Age: 38
Discharge: HOME | End: 2018-09-04
Attending: OBSTETRICS & GYNECOLOGY | Admitting: OBSTETRICS & GYNECOLOGY
Payer: COMMERCIAL

## 2018-09-04 DIAGNOSIS — O30.033 MONOCHORIONIC DIAMNIOTIC TWIN PREGNANCY, THIRD TRIMESTER: Primary | ICD-10-CM

## 2018-09-04 DIAGNOSIS — O30.032 MONOCHORIONIC DIAMNIOTIC TWIN GESTATION IN SECOND TRIMESTER: ICD-10-CM

## 2018-09-04 PROCEDURE — 76819 FETAL BIOPHYS PROFIL W/O NST: CPT | Mod: 59 | Performed by: OBSTETRICS & GYNECOLOGY

## 2018-09-04 PROCEDURE — 76816 OB US FOLLOW-UP PER FETUS: CPT

## 2018-09-04 NOTE — MR AVS SNAPSHOT
After Visit Summary   9/4/2018    Soni Lo    MRN: [id number]           Patient Information     Date Of Birth          1980        Visit Information        Provider Department      9/4/2018 8:30 AM Marnie Rosenbaum MD [facility] Maternal Fetal Medicine [facility]        Today's Diagnoses     Monochorionic diamniotic twin pregnancy, third trimester    -  1       Follow-ups after your visit        Your next 10 appointments already scheduled     Sep 07, 2018  3:00 PM CDT   MFM US COMPRE TWINS F/U with [id number]   [facility] Maternal Fetal Medicine Ultrasound - [facility] ([facility])    [address]  [facility] [address] [phone]           Wear comfortable clothes and leave your valuables at home.            Sep 07, 2018  3:30 PM CDT   Radiology MD with  BLANCA PALUMBO   [facility] Maternal Fetal Medicine [facility] ([facility])    [address]  [facility] [address] [phone]           Please arrive at the time given for your first appointment. This visit is used internally to schedule the physician's time during your ultrasound.            Sep 11, 2018  8:00 AM CDT   MFM US COMPRE TWINS F/U with [id number]   [facility] Maternal Fetal Medicine Ultrasound - [facility] ([facility])    [address]  [facility] [id number] [phone]           Wear comfortable clothes and leave your valuables at home.            Sep 11, 2018  8:30 AM CDT   Radiology MD with [facility]   [facility] Maternal Fetal Medicine [facility] ([facility])    [address]  [facility] [address] [phone]           Please arrive at the time given for your first appointment. This visit is used internally to schedule the physician's time during your ultrasound.            Sep 14, 2018  8:00 AM CDT   MFM US COMPRE TWINS F/U with [id number]   [facility] Maternal Fetal Medicine Ultrasound - [facility]  [facility])    [address]  [facility] [address]   [phone]           Wear comfortable clothes and leave your valuables at home.            Sep 14, 2018  8:30 AM CDT   Radiology MD with RH BLANCA PALUMBO   [facility] Maternal Fetal Medicine - [facility] ([facility])    [address]  [facility] [address]   [phone]           Please arrive at the time given for your first appointment. This visit is used internally to schedule the physician's time during your ultrasound.            Sep 18, 2018  8:00 AM CDT   MFM US COMPRE TWINS F/U with RHMFMUSR2   [facility] M[facility] Fetal Medicine Ultrasound - [facility] ([facility])    [address]  [facility] [address]   [phone]           Wear comfortable clothes and leave your valuables at home.            Sep 18, 2018  8:30 AM CDT   Radiology MD with RH BLANCA PALUMBO   [facility] Maternal Fetal Medicine [facility])    [address]  [facility] [address]   [phone]           Please arrive at the time given for your first appointment. This visit is used internally to schedule the physician's time during your ultrasound.            Sep 21, 2018  3:00 PM CDT   MFM US COMPRE TWINS F/U with RHMFMUSR1   [facility] Maternal Fetal Medicine Ultrasound - [facility])    [address]  [facility] [address]   [phone]           Wear comfortable clothes and leave your valuables at home.            Sep 21, 2018  3:30 PM CDT   Radiology MD with RH BLANCA PALUMBO   [facility] Maternal Fetal Medicine [facility] ([facility])    [address]  [facility] [address]   [phone]           Please arrive at the time given for your first appointment. This visit is used internally to schedule the physician's time during your ultrasound.              Who to contact     If you have questions or need follow up  information about today's clinic visit or your schedule please contact [facility] directly at [phone].  Normal or non-critical lab and imaging results will be communicated to you by TrialBeehart, letter or phone within 4 business days after the clinic has received the results. If you do not hear from us within 7 days, please contact the clinic through [employer]ee[employer] or phone. If you have a critical or abnormal lab result, we will notify you by phone as soon as possible.  Submit refill requests through SourceLair or call your pharmacy and they will forward the refill request to us. Please allow 3 business days for your refill to be completed.          Additional Information About Your Visit        [employer] Information     SourceLair gives you secure access to your electronic health record. If you see a primary care provider, you can also send messages to your care team and make appointments. If you have questions, please call your primary care clinic.  If you do not have a primary care provider, please call [phone] and they will assist you.        Care EveryWhere ID     This is your Care EveryWhere ID. This could be used by other organizations to access your [place] medical records  [id number]        Your Vitals Were     Last Period                   01/31/2018            Blood Pressure from Last 3 Encounters:   07/03/18 115/70    Weight from Last 3 Encounters:   No data found for Wt              Today, you had the following     No orders found for display       Primary Care Provider    None Specified       No primary provider on file.        Equal Access to Services     SONIA VICENTE : Joao Erickson, juany jorge, qaybevan eubanks . So [facility] [phone].    ATENCIÓN: Si habla español, tiene a luciano disposición servicios gratuitos de asistencia lingüística. Stephen al [phone].    We comply with applicable federal civil rights  laws and [place] laws. We do not discriminate on the basis of race, color, national origin, age, disability, sex, sexual orientation, or gender identity.            Thank you!     Thank you for choosing [facility]  for your care. Our goal is always to provide you with excellent care. Hearing back from our patients is one way we can continue to improve our services. Please take a few minutes to complete the written survey that you may receive in the mail after your visit with us. Thank you!             Your Updated Medication List - Protect others around you: Learn how to safely use, store and throw away your medicines at www.disposemymeds.org.      Notice  As of 9/4/2018  2:51 PM    You have not been prescribed any medications.

## 2018-09-07 ENCOUNTER — HOSPITAL ENCOUNTER (OUTPATIENT)
Dept: ULTRASOUND IMAGING | Facility: CLINIC | Age: 38
Discharge: HOME | End: 2018-09-07
Attending: OBSTETRICS & GYNECOLOGY | Admitting: OBSTETRICS & GYNECOLOGY
Payer: COMMERCIAL

## 2018-09-07 ENCOUNTER — OFFICE VISIT (OUTPATIENT)
Dept: MATERNAL FETAL MEDICINE | Facility: CLINIC | Age: 38
End: 2018-09-07
Attending: OBSTETRICS & GYNECOLOGY
Payer: COMMERCIAL

## 2018-09-07 DIAGNOSIS — O30.033 MONOCHORIONIC DIAMNIOTIC TWIN PREGNANCY, THIRD TRIMESTER: Primary | ICD-10-CM

## 2018-09-07 DIAGNOSIS — O30.032 MONOCHORIONIC DIAMNIOTIC TWIN GESTATION IN SECOND TRIMESTER: ICD-10-CM

## 2018-09-07 PROCEDURE — 76821 MIDDLE CEREBRAL ARTERY ECHO: CPT | Performed by: OBSTETRICS & GYNECOLOGY

## 2018-09-07 PROCEDURE — 76816 OB US FOLLOW-UP PER FETUS: CPT

## 2018-09-07 NOTE — MR AVS SNAPSHOT
After Visit Summary   9/7/2018    Soni Lo    MRN: [id number]           Patient Information     Date Of Birth          1980        Visit Information        Provider Department      9/7/2018 3:30 PM Mannie Yeboah MD [facility] Maternal Fetal Medicine [facility]        Today's Diagnoses     Monochorionic diamniotic twin pregnancy, third trimester    -  1       Follow-ups after your visit        Your next 10 appointments already scheduled     Sep 11, 2018  8:00 AM CDT   MFM US COMPRE TWINS F/U with [id number]   [facility] Maternal Fetal Medicine Ultrasound - [facility] ([facility])    [address]  [facility] [address] [phone]           Wear comfortable clothes and leave your valuables at home.            Sep 11, 2018  8:30 AM CDT   Radiology MD with  BLANCA PALUMBO   [facility] Maternal Fetal Medicine [facility] ([facility])    [address]  [facility] [address] [phone]           Please arrive at the time given for your first appointment. This visit is used internally to schedule the physician's time during your ultrasound.            Sep 14, 2018  8:00 AM CDT   MFM US COMPRE TWINS F/U with [id number]   [facility] Maternal Fetal Medicine Ultrasound - [facility] ([facility])    [address]  [facility] [address] [phone]           Wear comfortable clothes and leave your valuables at home.            Sep 14, 2018  8:30 AM CDT   Radiology MD with  BLANCA PALUMBO   [facility] Maternal Fetal Medicine [facility] ([facility])    [address]  [facility] [address] [phone]           Please arrive at the time given for your first appointment. This visit is used internally to schedule the physician's time during your ultrasound.            Sep 18, 2018  8:00 AM CDT   MFM US COMPRE TWINS F/U with [id number]   [facility] Maternal Fetal Medicine Ultrasound [facility]  ([facility])    [address]  [facility] [address] [phone]           Wear comfortable clothes and leave your valuables at home.            Sep 18, 2018  8:30 AM CDT   Radiology MD with  BLANCA PALUMBO   [facility] ([facility])    [address]  [facility] [address] [phone]           Please arrive at the time given for your first appointment. This visit is used internally to schedule the physician's time during your ultrasound.            Sep 21, 2018  3:00 PM CDT   M US COMPRE TWINS F/U with [id number]   [facility] Fetal [facility] Ultrasound - [facility] ([facility])    [address]  [facility] [address] [phone]           Wear comfortable clothes and leave your valuables at home.            Sep 21, 2018  3:30 PM CDT   Radiology MD with  BLANCA PALUMBO   [facility] ([facility])    [address]  [facility] [address] [phone]           Please arrive at the time given for your first appointment. This visit is used internally to schedule the physician's time during your ultrasound.              Who to contact     If you have questions or need follow up information about today's clinic visit or your schedule please contact [facility] MATERNAL FETAL [facility] directly at [phone].  Normal or non-critical lab and imaging results will be communicated to you by T[employer]az[employer]art, letter or phone within 4 business days after the clinic has received the results. If you do not hear from us within 7 days, please contact the clinic through [employer]art or phone. If you have a critical or abnormal lab result, we will notify you by phone as soon as possible.  Submit refill requests through [employer] or call your pharmacy and they will forward the refill request to us. Please allow 3 business days for your refill to be  completed.          Additional Information About Your Visit        [employer]or[employer]hart Information     [employer] gives you secure access to your electronic health record. If you see a primary care provider, you can also send messages to your care team and make appointments. If you have questions, please call your primary care clinic.  If you do not have a primary care provider, please call [phone] and they will assist you.        Care EveryWhere ID     This is your Care EveryWhere ID. This could be used by other organizations to access your [place] medical records  [id number]        Your Vitals Were     Last Period                   01/31/2018            Blood Pressure from Last 3 Encounters:   07/03/18 115/70    Weight from Last 3 Encounters:   No data found for Wt              Today, you had the following     No orders found for display       Primary Care Provider    None Specified       No primary provider on file.        Equal Access to Services     SHANTANUProvidence [facility] CampusVENECIA : Hadjasmin Erickson, waanderson lockwoodqadaha, qaaylinta kaalmasindy chaparro, evan barrow . So [facility] [phone].    ATENCIÓN: Si habla español, tiene a luciano disposición servicios gratuitos de asistencia lingüística. Llame al [phone].    We comply with applicable federal civil rights laws and [place] laws. We do not discriminate on the basis of race, color, national origin, age, disability, sex, sexual orientation, or gender identity.            Thank you!     Thank you for choosing [facility]  for your care. Our goal is always to provide you with excellent care. Hearing back from our patients is one way we can continue to improve our services. Please take a few minutes to complete the written survey that you may receive in the mail after your visit with us. Thank you!             Your Updated Medication List - Protect others around you: Learn how to safely use, store and throw away your medicines  at www.disposemymeds.org.      Notice  As of 9/7/2018 11:59 PM    You have not been prescribed any medications.

## 2018-09-11 ENCOUNTER — OFFICE VISIT (OUTPATIENT)
Dept: MATERNAL FETAL MEDICINE | Facility: CLINIC | Age: 38
End: 2018-09-11
Attending: OBSTETRICS & GYNECOLOGY
Payer: COMMERCIAL

## 2018-09-11 ENCOUNTER — HOSPITAL ENCOUNTER (OUTPATIENT)
Dept: ULTRASOUND IMAGING | Facility: CLINIC | Age: 38
Discharge: HOME | End: 2018-09-11
Attending: OBSTETRICS & GYNECOLOGY | Admitting: OBSTETRICS & GYNECOLOGY
Payer: COMMERCIAL

## 2018-09-11 DIAGNOSIS — O30.032 MONOCHORIONIC DIAMNIOTIC TWIN GESTATION IN SECOND TRIMESTER: ICD-10-CM

## 2018-09-11 DIAGNOSIS — O30.033 MONOCHORIONIC DIAMNIOTIC TWIN GESTATION IN THIRD TRIMESTER: Primary | ICD-10-CM

## 2018-09-11 PROCEDURE — 76816 OB US FOLLOW-UP PER FETUS: CPT | Mod: 59

## 2018-09-11 PROCEDURE — 59025 FETAL NON-STRESS TEST: CPT | Mod: ZF | Performed by: OBSTETRICS & GYNECOLOGY

## 2018-09-11 PROCEDURE — 76820 UMBILICAL ARTERY ECHO: CPT | Mod: 59

## 2018-09-11 PROCEDURE — 76818 FETAL BIOPHYS PROFILE W/NST: CPT | Performed by: OBSTETRICS & GYNECOLOGY

## 2018-09-11 PROCEDURE — 76820 UMBILICAL ARTERY ECHO: CPT

## 2018-09-11 NOTE — NURSING NOTE
See ultrasound report under imaging tab for further details of today's visit.      Failed BPP today, NST was reactive X2. Follow up scheduled on Friday.  Pt discharged ambulatory and stable.

## 2018-09-11 NOTE — MR AVS SNAPSHOT
After Visit Summary   9/11/2018    Soni Lo    MRN: [id number]           Patient Information     Date Of Birth          1980        Visit Information        Provider Department      9/11/2018 8:30 AM Angie Hoang MD [facility] Maternal Fetal Medicine [facility]        Today's Diagnoses     Monochorionic diamniotic twin gestation in third trimester    -  1       Follow-ups after your visit        Your next 10 appointments already scheduled     Sep 14, 2018  8:00 AM CDT   MFM US COMPRE TWINS F/U with [id number]   [facility] Maternal Fetal Medicine Ultrasound - [facility] ([facility])    [address]  [facility] [address] [phone]           Wear comfortable clothes and leave your valuables at home.            Sep 14, 2018  8:30 AM CDT   Radiology MD with  BLANCA PALUMBO   [facility] Fetal Medicine [facility] ([facility])    [address]  [facility] [address] [phone]           Please arrive at the time given for your first appointment. This visit is used internally to schedule the physician's time during your ultrasound.            Sep 18, 2018  8:00 AM CDT   MFM US COMPRE TWINS F/U with RHMFMUSR2   [facility] Maternal Fetal Medicine Ultrasound - [facility] ([facility])    [address]  [facility] [id number] [phone]           Wear comfortable clothes and leave your valuables at home.            Sep 18, 2018  8:30 AM CDT   [facility]   [facility] Maternal Fetal Medicine [facility] ([facility])    [address]  [facility] [address] [phone]           Please arrive at the time given for your first appointment. This visit is used internally to schedule the physician's time during your ultrasound.            Sep 21, 2018  3:00 PM CDT   MFM US COMPRE TWINS F/U with RHMFMUSR1   [facility] Maternal Fetal Medicine Ultrasound [facility]  ([facility])    [address] [facility] [address]  [facility] [id number] [phone]           Wear comfortable clothes and leave your valuables at home.            Sep 21, 2018  3:30 PM CDT   Radiology MD with RH BLANCA PALUMBO   [facility] Maternal Fetal [facility] ([facility])    [address]  [facility] [address] [phone]           Please arrive at the time given for your first appointment. This visit is used internally to schedule the physician's time during your ultrasound.              Who to contact     If you have questions or need follow up information about today's clinic visit or your schedule please contact [facility] MATERNAL FETAL [facility] directly at [phone].  Normal or non-critical lab and imaging results will be communicated to you by VitalTraxhart, letter or phone within 4 business days after the clinic has received the results. If you do not hear from us within 7 days, please contact the clinic through PERORAt or phone. If you have a critical or abnormal lab result, we will notify you by phone as soon as possible.  Submit refill requests through SavvySource for Parents or call your pharmacy and they will forward the refill request to us. Please allow 3 business days for your refill to be completed.          Additional Information About Your Visit        Sav[employer] for [employer] Information     [employer] gives you secure access to your electronic health record. If you see a primary care provider, you can also send messages to your care team and make appointments. If you have questions, please call your primary care clinic.  If you do not have a primary care provider, please call [phone] and they will assist you.        Care EveryWhere ID     This is your Care EveryWhere ID. This could be used by other organizations to access your [place] medical records  [id number]        Your Vitals Were     Last Period                   01/31/2018            Blood Pressure from  Last 3 Encounters:   07/03/18 115/70    Weight from Last 3 Encounters:   No data found for Wt              Today, you had the following     No orders found for display       Primary Care Provider    None Specified       No primary provider on file.        Equal Access to Services     SONIA VICENTE : Hadii aad ku haddinorah Erickson, wayonida luqadaha, jen kakathleenda krysta, evan sandoval dayanachristian hernandez laLisetangel . So [facility] [phone].    ATENCIÓN: Si habla español, tiene a luciano disposición servicios gratuitos de asistencia lingüística. Llame al [phone].    We comply with applicable federal civil rights laws and [place] laws. We do not discriminate on the basis of race, color, national origin, age, disability, sex, sexual orientation, or gender identity.            Thank you!     Thank you for choosing [facility]  for your care. Our goal is always to provide you with excellent care. Hearing back from our patients is one way we can continue to improve our services. Please take a few minutes to complete the written survey that you may receive in the mail after your visit with us. Thank you!             Your Updated Medication List - Protect others around you: Learn how to safely use, store and throw away your medicines at www.disposemymeds.org.      Notice  As of 9/11/2018 10:48 AM    You have not been prescribed any medications.

## 2018-09-14 ENCOUNTER — HOSPITAL ENCOUNTER (OUTPATIENT)
Dept: ULTRASOUND IMAGING | Facility: CLINIC | Age: 38
Discharge: HOME | End: 2018-09-14
Attending: OBSTETRICS & GYNECOLOGY | Admitting: OBSTETRICS & GYNECOLOGY
Payer: COMMERCIAL

## 2018-09-14 ENCOUNTER — OFFICE VISIT (OUTPATIENT)
Dept: MATERNAL FETAL MEDICINE | Facility: CLINIC | Age: 38
End: 2018-09-14
Attending: OBSTETRICS & GYNECOLOGY
Payer: COMMERCIAL

## 2018-09-14 DIAGNOSIS — O30.032 MONOCHORIONIC DIAMNIOTIC TWIN GESTATION IN SECOND TRIMESTER: ICD-10-CM

## 2018-09-14 DIAGNOSIS — O30.033 MONOCHORIONIC DIAMNIOTIC TWIN GESTATION IN THIRD TRIMESTER: Primary | ICD-10-CM

## 2018-09-14 PROCEDURE — 76816 OB US FOLLOW-UP PER FETUS: CPT

## 2018-09-14 PROCEDURE — 76819 FETAL BIOPHYS PROFIL W/O NST: CPT | Performed by: OBSTETRICS & GYNECOLOGY

## 2018-09-14 NOTE — PROGRESS NOTES
Please see full imaging report from ViewPoint program under imaging tab.      Agnie Hoang MD  Maternal Fetal Medicine

## 2018-09-14 NOTE — MR AVS SNAPSHOT
After Visit Summary   9/14/2018    Soni Lo    MRN: [id number]           Patient Information     Date Of Birth          1980        Visit Information        Provider Department      9/14/2018 8:30 AM Angie Hoang MD [facility] Maternal Fetal Medicine [facility]        Today's Diagnoses     Monochorionic diamniotic twin gestation in third trimester    -  1       Follow-ups after your visit        Your next 10 appointments already scheduled     Sep 18, 2018  8:00 AM CDT   MFM US COMPRE TWINS F/U with [id number]   [facility] Maternal Fetal Medicine Ultrasound - [facility])    [address]  [facility] [address] [phone]           Wear comfortable clothes and leave your valuables at home.            Sep 18, 2018  8:30 AM CDT   Radiology MD with  MFKENIA PALUMBO   [facility] Fetal [facility] [place] [facility])    [address]  [facility] [address] [phone]           Please arrive at the time given for your first appointment. This visit is used internally to schedule the physician's time during your ultrasound.            Sep 21, 2018  3:00 PM CDT   MFM US COMPRE TWINS F/U with RHMFMUSR1   [facility] Maternal Fetal Medicine Ultrasound - [facility] ([facility])    [address]  [facility] [address] [phone]           Wear comfortable clothes and leave your valuables at home.            Sep 21, 2018  3:30 PM CDT   Radiology [facility] with [facility]   [facility] Maternal Fetal [facility] [place] [facility])    [address]  [facility] [address] [phone]           Please arrive at the time given for your first appointment. This visit is used internally to schedule the physician's time during your ultrasound.              Who to contact     If you have questions or need follow up information about today's clinic visit or your schedule please  contact [facility] MATERNAL FETAL MEDICINE [facility] directly at [phone].  Normal or non-critical lab and imaging results will be communicated to you by MyChart, letter or phone within 4 business days after the clinic has received the results. If you do not hear from us within 7 days, please contact the clinic through Phonethics Mobile Mediahart or phone. If you have a critical or abnormal lab result, we will notify you by phone as soon as possible.  Submit refill requests through Payfone or call your pharmacy and they will forward the refill request to us. Please allow 3 business days for your refill to be completed.          Additional Information About Your Visit        [employer] Information     Payfone gives you secure access to your electronic health record. If you see a primary care provider, you can also send messages to your care team and make appointments. If you have questions, please call your primary care clinic.  If you do not have a primary care provider, please call [phone] and they will assist you.        Care EveryWhere ID     This is your Care EveryWhere ID. This could be used by other organizations to access your [place] medical records  [id number]        Your Vitals Were     Last Period                   01/31/2018            Blood Pressure from Last 3 Encounters:   07/03/18 115/70    Weight from Last 3 Encounters:   No data found for Wt              Today, you had the following     No orders found for display       Primary Care Provider    None Specified       No primary provider on file.        Equal Access to Services     [facility] : Hadii sonia Erickson, waaxda luqadaha, qaybta kaalevan luo. So [facility] [phone].    ATENCIÓN: Si habla español, tiene a luciano disposición servicios gratuitos de asistencia lingüística. Llame al [phone].    We comply with applicable federal civil rights laws and [place] laws. We do not discriminate on the basis  of race, color, national origin, age, disability, sex, sexual orientation, or gender identity.            Thank you!     Thank you for choosing [facility]  for your care. Our goal is always to provide you with excellent care. Hearing back from our patients is one way we can continue to improve our services. Please take a few minutes to complete the written survey that you may receive in the mail after your visit with us. Thank you!             Your Updated Medication List - Protect others around you: Learn how to safely use, store and throw away your medicines at www.disposemymeds.org.      Notice  As of 9/14/2018  2:07 PM    You have not been prescribed any medications.

## 2018-09-18 ENCOUNTER — HOSPITAL ENCOUNTER (OUTPATIENT)
Dept: ULTRASOUND IMAGING | Facility: CLINIC | Age: 38
Discharge: HOME | End: 2018-09-18
Attending: OBSTETRICS & GYNECOLOGY | Admitting: OBSTETRICS & GYNECOLOGY
Payer: COMMERCIAL

## 2018-09-18 ENCOUNTER — OFFICE VISIT (OUTPATIENT)
Dept: MATERNAL FETAL MEDICINE | Facility: CLINIC | Age: 38
End: 2018-09-18
Attending: OBSTETRICS & GYNECOLOGY
Payer: COMMERCIAL

## 2018-09-18 DIAGNOSIS — O30.033 MONOCHORIONIC DIAMNIOTIC TWIN GESTATION IN THIRD TRIMESTER: Primary | ICD-10-CM

## 2018-09-18 DIAGNOSIS — O30.032 MONOCHORIONIC DIAMNIOTIC TWIN GESTATION IN SECOND TRIMESTER: ICD-10-CM

## 2018-09-18 PROCEDURE — 76819 FETAL BIOPHYS PROFIL W/O NST: CPT | Performed by: OBSTETRICS & GYNECOLOGY

## 2018-09-18 PROCEDURE — 76816 OB US FOLLOW-UP PER FETUS: CPT

## 2018-09-18 NOTE — MR AVS SNAPSHOT
After Visit Summary   9/18/2018    Soni Lo    MRN: [id number]           Patient Information     Date Of Birth          1980        Visit Information        Provider Department      9/18/2018 8:30 AM Angie Hoang MD [facility] Maternal Fetal Medicine [facility]        Today's Diagnoses     Monochorionic diamniotic twin gestation in third trimester    -  1       Follow-ups after your visit        Your next 10 appointments already scheduled     Sep 21, 2018  3:00 PM CDT   MFM US COMPRE TWINS F/U with [id number]   [facility] Maternal Fetal Medicine Ultrasound - [facility] ([facility])    [address]  [facility] [address] [phone]           Wear comfortable clothes and leave your valuables at home.            Sep 21, 2018  3:30 PM CDT   Radiology MD with  MFM MD   [facility] Maternal Fetal Medicine [facility] ([facility])    [address]  [facility] [address] [phone]           Please arrive at the time given for your first appointment. This visit is used internally to schedule the physician's time during your ultrasound.              Who to contact     If you have questions or need follow up information about today's clinic visit or your schedule please contact [facility] MATERNAL FETAL [facility] directly at [phone].  Normal or non-critical lab and imaging results will be communicated to you by Elemental Technologieshart, letter or phone within 4 business days after the clinic has received the results. If you do not hear from us within 7 days, please contact the clinic through MyChart or phone. If you have a critical or abnormal lab result, we will notify you by phone as soon as possible.  Submit refill requests through Leaky or call your pharmacy and they will forward the refill request to us. Please allow 3 business days for your refill to be completed.          Additional Information About Your Visit        Elemental TechnologiesConne[facility] Children's Medical CenterSunFunder  Information     BioDigital gives you secure access to your electronic health record. If you see a primary care provider, you can also send messages to your care team and make appointments. If you have questions, please call your primary care clinic.  If you do not have a primary care provider, please call [phone] and they will assist you.        Care EveryWhere ID     This is your Care EveryWhere ID. This could be used by other organizations to access your [place] medical records  [id number]        Your Vitals Were     Last Period                   01/31/2018            Blood Pressure from Last 3 Encounters:   07/03/18 115/70    Weight from Last 3 Encounters:   No data found for Wt              Today, you had the following     No orders found for display       Primary Care Provider    None Specified       No primary provider on file.        Equal Access to Services     SONIA VICENTE : Hadii sonia Erickson, wayonida ania, qaybta kaalmada krysta, evan barrow . So [facility] [phone].    ATENCIÓN: Si habla español, tiene a luciano disposición servicios gratuitos de asistencia lingüística. Llame al [phone].    We comply with applicable federal civil rights laws and [place] laws. We do not discriminate on the basis of race, color, national origin, age, disability, sex, sexual orientation, or gender identity.            Thank you!     Thank you for choosing [facility]  for your care. Our goal is always to provide you with excellent care. Hearing back from our patients is one way we can continue to improve our services. Please take a few minutes to complete the written survey that you may receive in the mail after your visit with us. Thank you!             Your Updated Medication List - Protect others around you: Learn how to safely use, store and throw away your medicines at www.disposemymeds.org.      Notice  As of 9/18/2018  9:00 AM    You have  not been prescribed any medications.

## 2018-09-21 ENCOUNTER — HOSPITAL ENCOUNTER (OUTPATIENT)
Dept: ULTRASOUND IMAGING | Facility: CLINIC | Age: 38
Discharge: HOME | End: 2018-09-21
Attending: OBSTETRICS & GYNECOLOGY | Admitting: OBSTETRICS & GYNECOLOGY
Payer: COMMERCIAL

## 2018-09-21 ENCOUNTER — OFFICE VISIT (OUTPATIENT)
Dept: MATERNAL FETAL MEDICINE | Facility: CLINIC | Age: 38
End: 2018-09-21
Attending: OBSTETRICS & GYNECOLOGY
Payer: COMMERCIAL

## 2018-09-21 DIAGNOSIS — O30.033 MONOCHORIONIC DIAMNIOTIC TWIN GESTATION IN THIRD TRIMESTER: Primary | ICD-10-CM

## 2018-09-21 DIAGNOSIS — O30.032 MONOCHORIONIC DIAMNIOTIC TWIN GESTATION IN SECOND TRIMESTER: ICD-10-CM

## 2018-09-21 DIAGNOSIS — O36.5932 INTRAUTERINE GROWTH RESTRICTION AFFECTING ANTEPARTUM CARE OF MOTHER IN THIRD TRIMESTER, FETUS 2: ICD-10-CM

## 2018-09-21 PROCEDURE — 76816 OB US FOLLOW-UP PER FETUS: CPT | Mod: 59

## 2018-09-21 PROCEDURE — 76821 MIDDLE CEREBRAL ARTERY ECHO: CPT | Performed by: OBSTETRICS & GYNECOLOGY

## 2018-09-21 NOTE — MR AVS SNAPSHOT
After Visit Summary   9/21/2018    Soni Lo    MRN: [id number]           Patient Information     Date Of Birth          1980        Visit Information        Provider Department      9/21/2018 3:30 PM Mannie Yeboah MD [facility] Maternal Fetal Medicine [facility]        Today's Diagnoses     Monochorionic diamniotic twin gestation in third trimester    -  1    Intrauterine growth restriction affecting antepartum care of mother in third trimester, fetus 2           Follow-ups after your visit        Who to contact     If you have questions or need follow up information about today's clinic visit or your schedule please contact [facility] MATERNAL FETAL MEDICINE [facility] directly at [phone].  Normal or non-critical lab and imaging results will be communicated to you by Upshothart, letter or phone within 4 business days after the clinic has received the results. If you do not hear from us within 7 days, please contact the clinic through Open Milet or phone. If you have a critical or abnormal lab result, we will notify you by phone as soon as possible.  Submit refill requests through Action Online Entertainment or call your pharmacy and they will forward the refill request to us. Please allow 3 business days for your refill to be completed.          Additional Information About Your Visit        MyChart Information     [employer] Online Entertainment gives you secure access to your electronic health record. If you see a primary care provider, you can also send messages to your care team and make appointments. If you have questions, please call your primary care clinic.  If you do not have a primary care provider, please call [phone] and they will assist you.        Care EveryWhere ID     This is your Care EveryWhere ID. This could be used by other organizations to access your [place] medical records  [id number]        Your Vitals Were     Last Period                   01/31/2018            Blood Pressure from Last 3 Encounters:    07/03/18 115/70    Weight from Last 3 Encounters:   No data found for Wt              Today, you had the following     No orders found for display       Primary Care Provider    None Specified       No primary provider on file.        Equal Access to Services     SONIA VICENTE : Hadii aad ku hadamysapphire Erickson, joycesindy osorioana mariaha, jen chaparro, evan hiroin hayaamathew annechristian zacariasansley barrow . So [facility] [phone].    ATENCIÓN: Si habla español, tiene a luciano disposición servicios gratuitos de asistencia lingüística. Llame al [phone].    We comply with applicable federal civil rights laws and [place] laws. We do not discriminate on the basis of race, color, national origin, age, disability, sex, sexual orientation, or gender identity.            Thank you!     Thank you for choosing [facility]  for your care. Our goal is always to provide you with excellent care. Hearing back from our patients is one way we can continue to improve our services. Please take a few minutes to complete the written survey that you may receive in the mail after your visit with us. Thank you!             Your Updated Medication List - Protect others around you: Learn how to safely use, store and throw away your medicines at www.disposemymeds.org.      Notice  As of 9/21/2018 11:59 PM    You have not been prescribed any medications.

## 2018-09-21 NOTE — PROGRESS NOTES
"Please see \"Imaging\" tab under \"Chart Review\" for details of today's US at the [facility].    Mannie Yeboah MD  Maternal-Fetal Medicine    "

## 2020-03-11 ENCOUNTER — HEALTH MAINTENANCE LETTER (OUTPATIENT)
Age: 40
End: 2020-03-11

## 2021-01-03 ENCOUNTER — HEALTH MAINTENANCE LETTER (OUTPATIENT)
Age: 41
End: 2021-01-03

## 2021-04-05 ENCOUNTER — AMBULATORY - HEALTHEAST (OUTPATIENT)
Dept: NURSING | Facility: CLINIC | Age: 41
End: 2021-04-05

## 2021-04-25 ENCOUNTER — HEALTH MAINTENANCE LETTER (OUTPATIENT)
Age: 41
End: 2021-04-25

## 2021-04-26 ENCOUNTER — AMBULATORY - HEALTHEAST (OUTPATIENT)
Dept: NURSING | Facility: CLINIC | Age: 41
End: 2021-04-26

## 2021-05-30 VITALS — WEIGHT: 164.9 LBS

## 2021-06-01 VITALS — BODY MASS INDEX: 30.05 KG/M2 | WEIGHT: 176 LBS | HEIGHT: 64 IN

## 2021-06-09 NOTE — PROGRESS NOTES
Assessment/Plan:   Nausea & vomiting  Might be a viral illness vs related to the vertigo.    - ondansetron injection 4 mg (ZOFRAN); Inject 2 mL (4 mg total) into the shoulder, thigh, or buttocks once.  - ondansetron (ZOFRAN-ODT) 4 MG disintegrating tablet; 1 tab every 8 hours if needed for nausea and vomiting.  May use 2 tabs for severe nausea.  Dispense: 20 tablet; Refill: 0  Vertigo  Onset following viral respiratory illness.  Good improvement after shot of zofran in the office.  Able to drink and eat crackers.   - meclizine (ANTIVERT) 25 mg tablet; Take 1 tablet (25 mg total) by mouth 3 (three) times a day as needed for dizziness or nausea.  Dispense: 30 tablet; Refill: 1  URI/flu-like illness, resolving    Small amounts of bland diet as tolerated - sneak it through the stomach, few bites then wait then again.  Small sips of fluids until doing better - wait, then few more sips.  Meclizine - antihistamine for vertigo and nausea - try tonight and may continue every 8 hours if helpful for nausea and dizziness  Zofran - one every 8 hours for nausea as needed, may use two if severe  Rest  Follow up as needed.    Subjective:      Gamal Hermosillo is a 36 y.o. female who presents with nausea and vomiting for about 3 days.  A week ago she developed weakness, chills, URI, cough and fever and body aches.  She took tylenol, ibuprofen, mucinex and benadryl and those symptoms seem to be resolved.  3 days ago she developed vertigo, light sensitivity, sound sensitivity, weakness again and nausea with vomiting.  She has had a hard time keeping anything down.  The actual vertigo is mild, barely noticed with just some movements.  The nausea is the worst problem.  No diarrhea.  No rash.  No vision changes or headache.  No history of migraine.  NKDA.  No routine medications.  Denies pregnancy.  LMP 3/6/17.    Patient Active Problem List   Diagnosis     Subclinical Hyperthyroidism     Vitamin D Deficiency       Objective:     /70   Pulse 61  Temp 98  F (36.7  C) (Oral)   Resp 20  Wt 164 lb 14.4 oz (74.8 kg)  SpO2 98%  Breastfeeding? No  BMI 28.75 kg/m2    Physical  General Appearance: Alert, cooperative, no distress but fatigued and ill appearing  Head: Normocephalic, without obvious abnormality, atraumatic  Eyes: Conjunctivae are normal. Extraocular movements are intact. PERRLA, fundi normal.  No definite nystagmus.    Ears: Normal TMs and external ear canals, both ears  Nose: No significant congestion. No sinus pain  Throat: Throat is normal.  No exudate.  No significant lesions  Neck: No adenopathy, neck supple.  Mild vertigo with movement both sides.   Lungs: Clear to auscultation bilaterally, respirations unlabored  Heart: Regular rate and rhythm  Extremities: No lower extremity edema.  Negative romberg, neuro nonfocal and normal  Skin:  no rashes or lesions  Psychiatric: Patient has a normal mood and affect.

## 2021-06-17 NOTE — PROGRESS NOTES
Progress Note    Reason for Visit:  Chief Complaint     Thyroid Problem          Progress Note:    HPI:   This patient seen saltation at the request of the primary care physician because of hyperthyroidism due to pregnancy.    Thank you for referring this pleasant 37-year-old old female patient who is currently 11 weeks pregnant this is her third pregnancy she is pregnant and twins.    She said that the patient is asymptomatic but during pregnancy of her second child her thyroid function became abnormal and it was just monitor.    Her brother had Graves' disease she is denying family history of diabetes.    Otherwise the patient feels fine.    Thyroid exam is normal she has no evidence of Graves' eye disease by exam she is euthyroid.      Review of Systems:    Nervous System: No headache, dizziness, fainting or memory loss. No tingling sensation of hand or feet.  Ears: No hearing loss or ringing in the ears  Eyes: No blurring of vision, redness, itching or dryness.  Nose: No nosebleed or loss of smell  Mouth: No mouth sores or loss of taste  Throat: No hoarseness or difficulty swallowing  Neck: No enlarged thyroid or lymph nodes.  Heart: No chest pain, palpitation or irregular heartbeat. No swelling of hands or feet  Lungs: No shortness of breath, cough, night sweats, wheezing or hemoptysis.  Gastrointestinal: No nausea or vomiting, constipation or diarrhea.  No acid reflux, abdominal pain or blood in stools.  Kidney/Bladdr: No polyuria, polydipsia, nocturia or hematuria.  Genital/Sexual: No loss of libido  Skin: No rash, hair loss or hirsutism.  No abnormal striae  Muscles/Joints/Bones: No morning stiffness, muscle aches and pain or loss of height.    Current Medications:  Current Outpatient Prescriptions   Medication Sig     calcium carbonate/vitamin D3 (CALCIUM WITH VITAMIN D ORAL) Take by mouth. Calcium 600 mg and vitamin D 800 units. Take 2 tablest daily.     omega-3/dha/epa/fish oil (FISH OIL-OMEGA-3 FATTY  "ACIDS) 300-1,000 mg capsule Take 10,001 mg by mouth daily.     prenatal vit calc,iron,folic (PRENATAL VITAMIN ORAL) Take 1 tablet by mouth daily.       Patients Active Problems:  Patient Active Problem List   Diagnosis     Subclinical Hyperthyroidism     Vitamin D Deficiency       History:   reports that she has quit smoking. Her smoking use included Cigarettes. She has quit using smokeless tobacco.   reports that she has quit smoking. Her smoking use included Cigarettes. She has quit using smokeless tobacco. Her alcohol and drug histories are not on file.  History   Smoking Status     Former Smoker     Types: Cigarettes   Smokeless Tobacco     Former User      reports that she has quit smoking. Her smoking use included Cigarettes. She has quit using smokeless tobacco. Her alcohol and drug histories are not on file.  History   Sexual Activity     Sexual activity: Yes     Partners: Male     No past medical history on file.  Family History   Problem Relation Age of Onset     Hyperlipidemia Mother      Hypertension Mother      Hyperlipidemia Father      Hypertension Father      Graves' disease Brother      Vision loss Maternal Grandmother      glaucoma     Early death Maternal Grandfather      accidental     No Medical Problems Paternal Grandmother      old age     Cancer Paternal Grandfather      stomach     Hypertension Sister      Hypertension Brother      No past medical history on file.  Past Surgical History:   Procedure Laterality Date     LIPOSUCTION EXTREMITIES  2005     WISDOM TOOTH EXTRACTION         Vitals   height is 5' 3.5\" (1.613 m) and weight is 176 lb (79.8 kg). Her blood pressure is 112/72.         Exam  General appearance: The patient looked well, not in acute distress.  Eyes: no evidence of thyroid eye disease.   Retinal exam: No evidence of diabetic retinopathy.  Mouth and Throat: Normal  Neck: No evidence of thyromegaly, enlarged lymph node or tenderness  Chest: Trachea is central. Chest is clear " to auscultation and percussion. Breat sounds are normal.  Cardiovascular exam: JVP is not raised. Heart sounds are normal, no murmurs or rub  Peripheral pulses are palpable.   Abdomen: No masses or tenderness.    Back: No vertebral tenderness or kyphosis.  Extremities: No evidence of leg edema.   Skin: Normal to touch.  No abnormal striae  Neurologic exam:  Visual fields are intact by confrontation, grossly intact. No evidence of peripheral neuropathy.  Detailed foot exam normal.        Diagnosis:  No diagnosis found.    Orders:   No orders of the defined types were placed in this encounter.        Assessment and Plan: Hyperthyroidism due to pregnancy.  I discussed with the patient the pathophysiology of the disease and I told her due to the similarity between H CG and the TSH she is likely to develop suppressed TSH and hypothyroidism.    The patient is asymptomatic and this initially happened the patient has splints with you to the higher hCG levels.  Which is the case here.    I discussed with the patient that at this stage there is no need for any active medication.    Her TSH is 0.25 free T4 1.24.  Which is in keeping with the above diagnosis.    Vitamin D deficiency has her vitamin D is 14.2 she is on replacement.    Her grand mother had diabetes but patient's A1c is normal at 5.    We will check a thyroid function test again in 6 weeks but I have not given her if any further appointments on expecting the thyroid function to start normalizing.  We will keep an eye on it during her pregnancy.    I have reviewed and ordered clinical lab test    I have reviewed and ordered radiology tests.    I have reviewed and ordered her medication as required.    I have reviewed her test results and advised with the performing physician.    I have reviewed the patient's old records.    I have reviewed and summarized the patient old records.    I did spend 60 minutes with the patient more than 50% was spent on counseling and  managing her care.

## 2021-10-10 ENCOUNTER — HEALTH MAINTENANCE LETTER (OUTPATIENT)
Age: 41
End: 2021-10-10

## 2022-05-21 ENCOUNTER — HEALTH MAINTENANCE LETTER (OUTPATIENT)
Age: 42
End: 2022-05-21

## 2022-09-18 ENCOUNTER — HEALTH MAINTENANCE LETTER (OUTPATIENT)
Age: 42
End: 2022-09-18

## 2023-06-04 ENCOUNTER — HEALTH MAINTENANCE LETTER (OUTPATIENT)
Age: 43
End: 2023-06-04

## 2024-02-25 ENCOUNTER — HEALTH MAINTENANCE LETTER (OUTPATIENT)
Age: 44
End: 2024-02-25